# Patient Record
Sex: MALE | Race: WHITE | NOT HISPANIC OR LATINO | Employment: OTHER | URBAN - METROPOLITAN AREA
[De-identification: names, ages, dates, MRNs, and addresses within clinical notes are randomized per-mention and may not be internally consistent; named-entity substitution may affect disease eponyms.]

---

## 2017-01-05 ENCOUNTER — GENERIC CONVERSION - ENCOUNTER (OUTPATIENT)
Dept: OTHER | Facility: OTHER | Age: 52
End: 2017-01-05

## 2018-01-10 NOTE — RESULT NOTES
Message   Recorded as Task   Date: 09/27/2016 03:15 PM, Created By: Jerica Aguirre   Task Name: Follow Up   Assigned To: Lisette Livingston   Regarding Patient: Sharon Campos, Status: In Progress   Comment:    Jerica Aguirre - 27 Sep 2016 3:15 PM     TASK CREATED  S/p Right L4-5 TFESI on 9/22/16 w/Dr Jalyn Wei in [facility]  No f/u scheduled    Please call 9/29/16   Jerica Aguirre - 30 Sep 2016 3:05 PM     TASK EDITED  1st attempt-lmom for f/u after injectMorenita Logan - 03 Oct 2016 10:26 AM     TASK EDITED  S/w patient for followup after injection    -reports 80-85% relief  -states he still has some numbness and tingling down the right side  -reports yesterday he felt great and today he feels a little sore from being able to do things   -last night was the first he was able to sleep in his bed   -if any recommadations, please call patient back on cell # [phone]   Buck Moreira - 03 Oct 2016 1:14 PM     TASK REPLIED TO: Previously Assigned To Buck Moreira repeat injection x 1 and then f/u with dg after   Jerica Aguirre - 04 Oct 2016 2:24 PM     TASK REASSIGNED: Previously Assigned To [facility] procedure,Team  Please call pt to schedule right L4-5 TFESI   Lisette Livingston - 04 Oct 2016 4:13 PM     TASK REASSIGNED: Previously Assigned To [facility] surgery sched,Team   Lisette Livingston - 05 Oct 2016 9:45 AM     TASK EDITED  procedure scheduled for 10/6/2016-reviewed pre-procedure instructions with pt

## 2018-01-12 NOTE — RESULT NOTES
Message   Recorded as Task   Date: 12/28/2016 10:40 AM, Created By: Krystina Guerrier   Task Name: Follow Up   Assigned To: [facility] procedure,Team   Regarding Patient: Nuzhat Reinoso, Status: Active   Comment:    Jerica Aguirre - 28 Dec 2016 10:40 AM     TASK CREATED  S/p Left L4/5 TFESI on 12/22/16 w/Dr Radha Gil in [place] [facility]  No f/u scheduled    Please call 12/29/16   Jerica Aguirre - 30 Dec 2016 3:43 PM     TASK EDITED  1st attempt-lm for f/u after injection   Nohemi Milan - 05 Jan 8027 2:48 PM     TASK EDITED  Pt reports 100% relief and is doing well  Patient is going to start riding his exercise bike  Will f/u PRN     Buck Moreira - 05 Jan 2017 1:52 PM     TASK REPLIED TO: Previously Assigned To Buck Moreira  [place]        Signatures   Electronically signed by : Sukumar Carrera, ; Jan 5 2017  1:54PM EST                       (Author)

## 2018-01-15 NOTE — RESULT NOTES
Message   Recorded as Task   Date: 12/19/2016 09:35 AM, Created By: Jessi Alcantar   Task Name: Medical Complaint Callback   Assigned To: Lisette Livingston   Regarding Patient: Roney Knight, Status: Active   Comment:    JavierDanielle cuadra - 19 Dec 2016 9:35 AM     TASK CREATED  Caller: Self; Medical Complaint; [phone] (Home)  Received a vmlom from 0919 today stating he had two Right L4-5 TFESI's previously ( 9/22 and 10/06)with you and the symptoms that were going down his RLE are now gone  But for the past few nights he has noticed that the pain is now going down his LLE and he is having trouble sleeping  He has no sovs scheduled  SL to advise   thanks   Buck Moreira - 19 Dec 2016 9:49 AM     TASK REPLIED TO: Previously Assigned To Lisette Livingston  left l4/5 tfesi x 2, same codes   Lisette Livingston - 19 Dec 2016 11:06 AM     TASK EDITED  procedure scheduled for 12/23/2016

## 2018-01-18 NOTE — RESULT NOTES
Message   Recorded as Task   Date: 09/27/2016 03:15 PM, Created By: Jerica Aguirre   Task Name: Follow Up   Assigned To: Lisette Livingston   Regarding Patient: Zoran Lopez, Status: In Progress   Comment:    Jerica Aguirre - 27 Sep 2016 3:15 PM     TASK CREATED  S/p Right L4-5 TFESI on 9/22/16 w/Dr Florentin Arroyo in [place]  No f/u scheduled    Please call 9/29/16   Jerica Aguirre - 30 Sep 2016 3:05 PM     TASK EDITED  1st attempt-lmom for f/u after injectMorenita Logan - 03 Oct 2016 10:26 AM     TASK EDITED  S/w patient for followup after injection    -reports 80-85% relief  -states he still has some numbness and tingling down the right side  -reports yesterday he felt great and today he feels a little sore from being able to do things   -last night was the first he was able to sleep in his bed   -if any recommadations, please call patient back on cell # [phone]   Buck Moreira - 03 Oct 2016 1:14 PM     TASK REPLIED TO: Previously Assigned To Buck Moreira repeat injection x 1 and then f/u with dg after   Jerica Aguirre - 04 Oct 2016 2:24 PM     TASK REASSIGNED: Previously Assigned To [facility] procedure,Team  Please call pt to schedule right L4-5 TFESI   Lisette Livingston - 04 Oct 2016 4:13 PM     TASK REASSIGNED: Previously Assigned To [facility] sched,Team   Lisette Livingston - 05 Oct 2016 9:45 AM     TASK EDITED  procedure scheduled for 10/6/2016-reviewed pre-procedure instructions with pt

## 2019-04-13 ENCOUNTER — OFFICE VISIT (OUTPATIENT)
Dept: URGENT CARE | Facility: CLINIC | Age: 54
End: 2019-04-13
Payer: COMMERCIAL

## 2019-04-13 VITALS
HEIGHT: 76 IN | TEMPERATURE: 97.6 F | WEIGHT: 240 LBS | HEART RATE: 80 BPM | DIASTOLIC BLOOD PRESSURE: 78 MMHG | OXYGEN SATURATION: 96 % | SYSTOLIC BLOOD PRESSURE: 132 MMHG | RESPIRATION RATE: 16 BRPM | BODY MASS INDEX: 29.22 KG/M2

## 2019-04-13 DIAGNOSIS — S61.432A PUNCTURE WOUND OF LEFT HAND, FOREIGN BODY PRESENCE UNSPECIFIED, INITIAL ENCOUNTER: Primary | ICD-10-CM

## 2019-04-13 PROCEDURE — 90715 TDAP VACCINE 7 YRS/> IM: CPT

## 2019-04-13 PROCEDURE — 99213 OFFICE O/P EST LOW 20 MIN: CPT | Performed by: EMERGENCY MEDICINE

## 2019-04-13 RX ORDER — LIDOCAINE HYDROCHLORIDE 10 MG/ML
5 INJECTION, SOLUTION EPIDURAL; INFILTRATION; INTRACAUDAL; PERINEURAL ONCE
Status: COMPLETED | OUTPATIENT
Start: 2019-04-13 | End: 2019-04-13

## 2019-04-13 RX ORDER — CEPHALEXIN 500 MG/1
500 CAPSULE ORAL EVERY 6 HOURS SCHEDULED
Qty: 40 CAPSULE | Refills: 0 | Status: SHIPPED | OUTPATIENT
Start: 2019-04-13 | End: 2019-04-18 | Stop reason: HOSPADM

## 2019-04-13 RX ADMIN — LIDOCAINE HYDROCHLORIDE 5 ML: 10 INJECTION, SOLUTION EPIDURAL; INFILTRATION; INTRACAUDAL; PERINEURAL at 12:19

## 2019-04-15 ENCOUNTER — OFFICE VISIT (OUTPATIENT)
Dept: OBGYN CLINIC | Facility: CLINIC | Age: 54
End: 2019-04-15
Payer: COMMERCIAL

## 2019-04-15 ENCOUNTER — APPOINTMENT (INPATIENT)
Dept: RADIOLOGY | Facility: HOSPITAL | Age: 54
DRG: 982 | End: 2019-04-15
Payer: COMMERCIAL

## 2019-04-15 ENCOUNTER — HOSPITAL ENCOUNTER (INPATIENT)
Facility: HOSPITAL | Age: 54
LOS: 3 days | Discharge: HOME | DRG: 982 | End: 2019-04-18
Attending: ORTHOPAEDIC SURGERY | Admitting: ORTHOPAEDIC SURGERY
Payer: COMMERCIAL

## 2019-04-15 VITALS
HEIGHT: 76 IN | WEIGHT: 246.6 LBS | HEART RATE: 76 BPM | SYSTOLIC BLOOD PRESSURE: 123 MMHG | DIASTOLIC BLOOD PRESSURE: 81 MMHG | BODY MASS INDEX: 30.03 KG/M2

## 2019-04-15 DIAGNOSIS — S60.512A INFECTED ABRASION OF LEFT HAND, INITIAL ENCOUNTER: ICD-10-CM

## 2019-04-15 DIAGNOSIS — S60.512A INFECTED ABRASION OF LEFT HAND, INITIAL ENCOUNTER: Primary | ICD-10-CM

## 2019-04-15 DIAGNOSIS — L08.9 INFECTED ABRASION OF LEFT HAND, INITIAL ENCOUNTER: Primary | ICD-10-CM

## 2019-04-15 DIAGNOSIS — L03.119 CELLULITIS AND ABSCESS OF HAND: Primary | ICD-10-CM

## 2019-04-15 DIAGNOSIS — L08.9 INFECTED ABRASION OF LEFT HAND, INITIAL ENCOUNTER: ICD-10-CM

## 2019-04-15 DIAGNOSIS — L02.519 CELLULITIS AND ABSCESS OF HAND: Primary | ICD-10-CM

## 2019-04-15 LAB
ABO GROUP BLD: NORMAL
ANION GAP SERPL CALCULATED.3IONS-SCNC: 5 MMOL/L (ref 4–13)
APTT PPP: 28 SECONDS (ref 26–38)
BLD GP AB SCN SERPL QL: NEGATIVE
BUN SERPL-MCNC: 24 MG/DL (ref 5–25)
CALCIUM SERPL-MCNC: 8.4 MG/DL (ref 8.3–10.1)
CHLORIDE SERPL-SCNC: 108 MMOL/L (ref 100–108)
CO2 SERPL-SCNC: 27 MMOL/L (ref 21–32)
CREAT SERPL-MCNC: 1.07 MG/DL (ref 0.6–1.3)
ERYTHROCYTE [DISTWIDTH] IN BLOOD BY AUTOMATED COUNT: 12.3 % (ref 11.6–15.1)
GFR SERPL CREATININE-BSD FRML MDRD: 79 ML/MIN/1.73SQ M
GLUCOSE SERPL-MCNC: 121 MG/DL (ref 65–140)
HCT VFR BLD AUTO: 41.1 % (ref 36.5–49.3)
HGB BLD-MCNC: 14.3 G/DL (ref 12–17)
INR PPP: 0.96 (ref 0.86–1.17)
MCH RBC QN AUTO: 33.8 PG (ref 26.8–34.3)
MCHC RBC AUTO-ENTMCNC: 34.8 G/DL (ref 31.4–37.4)
MCV RBC AUTO: 97 FL (ref 82–98)
PLATELET # BLD AUTO: 207 THOUSANDS/UL (ref 149–390)
PMV BLD AUTO: 10.3 FL (ref 8.9–12.7)
POTASSIUM SERPL-SCNC: 3.8 MMOL/L (ref 3.5–5.3)
PROTHROMBIN TIME: 12.9 SECONDS (ref 11.8–14.2)
RBC # BLD AUTO: 4.23 MILLION/UL (ref 3.88–5.62)
RH BLD: POSITIVE
SODIUM SERPL-SCNC: 140 MMOL/L (ref 136–145)
SPECIMEN EXPIRATION DATE: NORMAL
WBC # BLD AUTO: 5.52 THOUSAND/UL (ref 4.31–10.16)

## 2019-04-15 PROCEDURE — 85610 PROTHROMBIN TIME: CPT | Performed by: STUDENT IN AN ORGANIZED HEALTH CARE EDUCATION/TRAINING PROGRAM

## 2019-04-15 PROCEDURE — 80048 BASIC METABOLIC PNL TOTAL CA: CPT | Performed by: STUDENT IN AN ORGANIZED HEALTH CARE EDUCATION/TRAINING PROGRAM

## 2019-04-15 PROCEDURE — 85730 THROMBOPLASTIN TIME PARTIAL: CPT | Performed by: STUDENT IN AN ORGANIZED HEALTH CARE EDUCATION/TRAINING PROGRAM

## 2019-04-15 PROCEDURE — 86900 BLOOD TYPING SEROLOGIC ABO: CPT | Performed by: STUDENT IN AN ORGANIZED HEALTH CARE EDUCATION/TRAINING PROGRAM

## 2019-04-15 PROCEDURE — 99204 OFFICE O/P NEW MOD 45 MIN: CPT | Performed by: ORTHOPAEDIC SURGERY

## 2019-04-15 PROCEDURE — 86850 RBC ANTIBODY SCREEN: CPT | Performed by: STUDENT IN AN ORGANIZED HEALTH CARE EDUCATION/TRAINING PROGRAM

## 2019-04-15 PROCEDURE — 73130 X-RAY EXAM OF HAND: CPT

## 2019-04-15 PROCEDURE — 93005 ELECTROCARDIOGRAM TRACING: CPT

## 2019-04-15 PROCEDURE — 85027 COMPLETE CBC AUTOMATED: CPT | Performed by: STUDENT IN AN ORGANIZED HEALTH CARE EDUCATION/TRAINING PROGRAM

## 2019-04-15 PROCEDURE — 71045 X-RAY EXAM CHEST 1 VIEW: CPT

## 2019-04-15 PROCEDURE — 86901 BLOOD TYPING SEROLOGIC RH(D): CPT | Performed by: STUDENT IN AN ORGANIZED HEALTH CARE EDUCATION/TRAINING PROGRAM

## 2019-04-15 RX ORDER — ACETAMINOPHEN 325 MG/1
650 TABLET ORAL EVERY 6 HOURS SCHEDULED
Status: DISCONTINUED | OUTPATIENT
Start: 2019-04-15 | End: 2019-04-18 | Stop reason: HOSPADM

## 2019-04-15 RX ORDER — IBUPROFEN 400 MG/1
TABLET ORAL EVERY 6 HOURS PRN
COMMUNITY

## 2019-04-15 RX ORDER — HYDROMORPHONE HCL/PF 1 MG/ML
0.5 SYRINGE (ML) INJECTION
Status: DISCONTINUED | OUTPATIENT
Start: 2019-04-15 | End: 2019-04-18 | Stop reason: HOSPADM

## 2019-04-15 RX ORDER — DOCUSATE SODIUM 100 MG/1
100 CAPSULE, LIQUID FILLED ORAL 2 TIMES DAILY
Status: DISCONTINUED | OUTPATIENT
Start: 2019-04-15 | End: 2019-04-18 | Stop reason: HOSPADM

## 2019-04-15 RX ORDER — SENNOSIDES 8.6 MG
1 TABLET ORAL DAILY
Status: DISCONTINUED | OUTPATIENT
Start: 2019-04-16 | End: 2019-04-17

## 2019-04-15 RX ORDER — OXYCODONE HYDROCHLORIDE 10 MG/1
10 TABLET ORAL EVERY 4 HOURS PRN
Status: DISCONTINUED | OUTPATIENT
Start: 2019-04-15 | End: 2019-04-18 | Stop reason: HOSPADM

## 2019-04-15 RX ORDER — OXYCODONE HYDROCHLORIDE 5 MG/1
5 TABLET ORAL EVERY 4 HOURS PRN
Status: DISCONTINUED | OUTPATIENT
Start: 2019-04-15 | End: 2019-04-18 | Stop reason: HOSPADM

## 2019-04-15 RX ADMIN — AMPICILLIN SODIUM AND SULBACTAM SODIUM 3 G: 2; 1 INJECTION, POWDER, FOR SOLUTION INTRAMUSCULAR; INTRAVENOUS at 19:04

## 2019-04-15 RX ADMIN — OXYCODONE HYDROCHLORIDE 10 MG: 10 TABLET ORAL at 23:04

## 2019-04-15 RX ADMIN — ACETAMINOPHEN 650 MG: 325 TABLET, FILM COATED ORAL at 20:31

## 2019-04-15 RX ADMIN — OXYCODONE HYDROCHLORIDE 5 MG: 5 TABLET ORAL at 19:13

## 2019-04-15 RX ADMIN — DOCUSATE SODIUM 100 MG: 100 CAPSULE, LIQUID FILLED ORAL at 20:31

## 2019-04-16 ENCOUNTER — ANESTHESIA EVENT (INPATIENT)
Dept: PERIOP | Facility: HOSPITAL | Age: 54
DRG: 982 | End: 2019-04-16
Payer: COMMERCIAL

## 2019-04-16 ENCOUNTER — ANESTHESIA (INPATIENT)
Dept: PERIOP | Facility: HOSPITAL | Age: 54
DRG: 982 | End: 2019-04-16
Payer: COMMERCIAL

## 2019-04-16 LAB
ANION GAP SERPL CALCULATED.3IONS-SCNC: 4 MMOL/L (ref 4–13)
ATRIAL RATE: 74 BPM
BASOPHILS # BLD AUTO: 0.02 THOUSANDS/ΜL (ref 0–0.1)
BASOPHILS NFR BLD AUTO: 0 % (ref 0–1)
BUN SERPL-MCNC: 21 MG/DL (ref 5–25)
CALCIUM SERPL-MCNC: 8 MG/DL (ref 8.3–10.1)
CHLORIDE SERPL-SCNC: 109 MMOL/L (ref 100–108)
CO2 SERPL-SCNC: 26 MMOL/L (ref 21–32)
CREAT SERPL-MCNC: 1.11 MG/DL (ref 0.6–1.3)
EOSINOPHIL # BLD AUTO: 0.21 THOUSAND/ΜL (ref 0–0.61)
EOSINOPHIL NFR BLD AUTO: 5 % (ref 0–6)
ERYTHROCYTE [DISTWIDTH] IN BLOOD BY AUTOMATED COUNT: 12.5 % (ref 11.6–15.1)
GFR SERPL CREATININE-BSD FRML MDRD: 76 ML/MIN/1.73SQ M
GLUCOSE SERPL-MCNC: 103 MG/DL (ref 65–140)
HCT VFR BLD AUTO: 41.9 % (ref 36.5–49.3)
HGB BLD-MCNC: 13.9 G/DL (ref 12–17)
IMM GRANULOCYTES # BLD AUTO: 0.01 THOUSAND/UL (ref 0–0.2)
IMM GRANULOCYTES NFR BLD AUTO: 0 % (ref 0–2)
LYMPHOCYTES # BLD AUTO: 1.44 THOUSANDS/ΜL (ref 0.6–4.47)
LYMPHOCYTES NFR BLD AUTO: 32 % (ref 14–44)
MCH RBC QN AUTO: 33 PG (ref 26.8–34.3)
MCHC RBC AUTO-ENTMCNC: 33.2 G/DL (ref 31.4–37.4)
MCV RBC AUTO: 100 FL (ref 82–98)
MONOCYTES # BLD AUTO: 0.57 THOUSAND/ΜL (ref 0.17–1.22)
MONOCYTES NFR BLD AUTO: 13 % (ref 4–12)
NEUTROPHILS # BLD AUTO: 2.25 THOUSANDS/ΜL (ref 1.85–7.62)
NEUTS SEG NFR BLD AUTO: 50 % (ref 43–75)
NRBC BLD AUTO-RTO: 0 /100 WBCS
P AXIS: 44 DEGREES
PLATELET # BLD AUTO: 186 THOUSANDS/UL (ref 149–390)
PMV BLD AUTO: 9.8 FL (ref 8.9–12.7)
POTASSIUM SERPL-SCNC: 4.1 MMOL/L (ref 3.5–5.3)
PR INTERVAL: 172 MS
QRS AXIS: 11 DEGREES
QRSD INTERVAL: 94 MS
QT INTERVAL: 370 MS
QTC INTERVAL: 410 MS
RBC # BLD AUTO: 4.21 MILLION/UL (ref 3.88–5.62)
SODIUM SERPL-SCNC: 139 MMOL/L (ref 136–145)
T WAVE AXIS: 25 DEGREES
VENTRICULAR RATE: 74 BPM
WBC # BLD AUTO: 4.5 THOUSAND/UL (ref 4.31–10.16)

## 2019-04-16 PROCEDURE — 20103 EXPL PENTRG WOUND EXTREMITY: CPT | Performed by: ORTHOPAEDIC SURGERY

## 2019-04-16 PROCEDURE — 87075 CULTR BACTERIA EXCEPT BLOOD: CPT | Performed by: ORTHOPAEDIC SURGERY

## 2019-04-16 PROCEDURE — 87116 MYCOBACTERIA CULTURE: CPT | Performed by: ORTHOPAEDIC SURGERY

## 2019-04-16 PROCEDURE — 99024 POSTOP FOLLOW-UP VISIT: CPT | Performed by: ORTHOPAEDIC SURGERY

## 2019-04-16 PROCEDURE — 0KBD0ZZ EXCISION OF LEFT HAND MUSCLE, OPEN APPROACH: ICD-10-PCS | Performed by: ORTHOPAEDIC SURGERY

## 2019-04-16 PROCEDURE — 87205 SMEAR GRAM STAIN: CPT | Performed by: ORTHOPAEDIC SURGERY

## 2019-04-16 PROCEDURE — NC001 PR NO CHARGE: Performed by: ORTHOPAEDIC SURGERY

## 2019-04-16 PROCEDURE — 85025 COMPLETE CBC W/AUTO DIFF WBC: CPT | Performed by: STUDENT IN AN ORGANIZED HEALTH CARE EDUCATION/TRAINING PROGRAM

## 2019-04-16 PROCEDURE — 80048 BASIC METABOLIC PNL TOTAL CA: CPT | Performed by: STUDENT IN AN ORGANIZED HEALTH CARE EDUCATION/TRAINING PROGRAM

## 2019-04-16 PROCEDURE — 93010 ELECTROCARDIOGRAM REPORT: CPT | Performed by: INTERNAL MEDICINE

## 2019-04-16 PROCEDURE — 99254 IP/OBS CNSLTJ NEW/EST MOD 60: CPT | Performed by: INTERNAL MEDICINE

## 2019-04-16 PROCEDURE — 87102 FUNGUS ISOLATION CULTURE: CPT | Performed by: ORTHOPAEDIC SURGERY

## 2019-04-16 PROCEDURE — 87070 CULTURE OTHR SPECIMN AEROBIC: CPT | Performed by: ORTHOPAEDIC SURGERY

## 2019-04-16 PROCEDURE — 87206 SMEAR FLUORESCENT/ACID STAI: CPT | Performed by: ORTHOPAEDIC SURGERY

## 2019-04-16 RX ORDER — SODIUM CHLORIDE, SODIUM LACTATE, POTASSIUM CHLORIDE, CALCIUM CHLORIDE 600; 310; 30; 20 MG/100ML; MG/100ML; MG/100ML; MG/100ML
50 INJECTION, SOLUTION INTRAVENOUS CONTINUOUS
Status: DISCONTINUED | OUTPATIENT
Start: 2019-04-16 | End: 2019-04-18 | Stop reason: HOSPADM

## 2019-04-16 RX ORDER — CEFAZOLIN SODIUM 2 G/50ML
2000 SOLUTION INTRAVENOUS
Status: COMPLETED | OUTPATIENT
Start: 2019-04-16 | End: 2019-04-16

## 2019-04-16 RX ORDER — PROPOFOL 10 MG/ML
INJECTION, EMULSION INTRAVENOUS AS NEEDED
Status: DISCONTINUED | OUTPATIENT
Start: 2019-04-16 | End: 2019-04-16 | Stop reason: SURG

## 2019-04-16 RX ORDER — FENTANYL CITRATE 50 UG/ML
INJECTION, SOLUTION INTRAMUSCULAR; INTRAVENOUS AS NEEDED
Status: DISCONTINUED | OUTPATIENT
Start: 2019-04-16 | End: 2019-04-16 | Stop reason: SURG

## 2019-04-16 RX ORDER — ONDANSETRON 2 MG/ML
4 INJECTION INTRAMUSCULAR; INTRAVENOUS ONCE AS NEEDED
Status: DISCONTINUED | OUTPATIENT
Start: 2019-04-16 | End: 2019-04-16 | Stop reason: HOSPADM

## 2019-04-16 RX ORDER — ONDANSETRON 2 MG/ML
INJECTION INTRAMUSCULAR; INTRAVENOUS AS NEEDED
Status: DISCONTINUED | OUTPATIENT
Start: 2019-04-16 | End: 2019-04-16 | Stop reason: SURG

## 2019-04-16 RX ORDER — LIDOCAINE HYDROCHLORIDE 10 MG/ML
INJECTION, SOLUTION INFILTRATION; PERINEURAL AS NEEDED
Status: DISCONTINUED | OUTPATIENT
Start: 2019-04-16 | End: 2019-04-16 | Stop reason: SURG

## 2019-04-16 RX ORDER — SODIUM CHLORIDE, SODIUM LACTATE, POTASSIUM CHLORIDE, CALCIUM CHLORIDE 600; 310; 30; 20 MG/100ML; MG/100ML; MG/100ML; MG/100ML
INJECTION, SOLUTION INTRAVENOUS CONTINUOUS PRN
Status: DISCONTINUED | OUTPATIENT
Start: 2019-04-16 | End: 2019-04-16 | Stop reason: SURG

## 2019-04-16 RX ORDER — FENTANYL CITRATE/PF 50 MCG/ML
50 SYRINGE (ML) INJECTION
Status: DISCONTINUED | OUTPATIENT
Start: 2019-04-16 | End: 2019-04-16 | Stop reason: HOSPADM

## 2019-04-16 RX ORDER — DEXAMETHASONE SODIUM PHOSPHATE 10 MG/ML
INJECTION, SOLUTION INTRAMUSCULAR; INTRAVENOUS AS NEEDED
Status: DISCONTINUED | OUTPATIENT
Start: 2019-04-16 | End: 2019-04-16 | Stop reason: SURG

## 2019-04-16 RX ORDER — FENTANYL CITRATE/PF 50 MCG/ML
25 SYRINGE (ML) INJECTION
Status: DISCONTINUED | OUTPATIENT
Start: 2019-04-16 | End: 2019-04-16 | Stop reason: HOSPADM

## 2019-04-16 RX ORDER — CEFAZOLIN SODIUM 2 G/50ML
2000 SOLUTION INTRAVENOUS EVERY 8 HOURS
Status: DISCONTINUED | OUTPATIENT
Start: 2019-04-16 | End: 2019-04-18 | Stop reason: HOSPADM

## 2019-04-16 RX ORDER — SODIUM CHLORIDE, SODIUM LACTATE, POTASSIUM CHLORIDE, CALCIUM CHLORIDE 600; 310; 30; 20 MG/100ML; MG/100ML; MG/100ML; MG/100ML
20 INJECTION, SOLUTION INTRAVENOUS CONTINUOUS
Status: DISCONTINUED | OUTPATIENT
Start: 2019-04-16 | End: 2019-04-18 | Stop reason: HOSPADM

## 2019-04-16 RX ADMIN — FENTANYL CITRATE 50 MCG: 50 INJECTION, SOLUTION INTRAMUSCULAR; INTRAVENOUS at 13:33

## 2019-04-16 RX ADMIN — FENTANYL CITRATE 50 MCG: 50 INJECTION, SOLUTION INTRAMUSCULAR; INTRAVENOUS at 12:13

## 2019-04-16 RX ADMIN — SODIUM CHLORIDE, SODIUM LACTATE, POTASSIUM CHLORIDE, AND CALCIUM CHLORIDE 20 ML/HR: .6; .31; .03; .02 INJECTION, SOLUTION INTRAVENOUS at 14:19

## 2019-04-16 RX ADMIN — ACETAMINOPHEN 650 MG: 325 TABLET, FILM COATED ORAL at 17:13

## 2019-04-16 RX ADMIN — HYDROMORPHONE HYDROCHLORIDE 0.5 MG: 1 INJECTION, SOLUTION INTRAMUSCULAR; INTRAVENOUS; SUBCUTANEOUS at 21:25

## 2019-04-16 RX ADMIN — AMPICILLIN SODIUM AND SULBACTAM SODIUM 3 G: 2; 1 INJECTION, POWDER, FOR SOLUTION INTRAMUSCULAR; INTRAVENOUS at 02:06

## 2019-04-16 RX ADMIN — ACETAMINOPHEN 650 MG: 325 TABLET, FILM COATED ORAL at 05:14

## 2019-04-16 RX ADMIN — FENTANYL CITRATE 50 MCG: 50 INJECTION, SOLUTION INTRAMUSCULAR; INTRAVENOUS at 13:39

## 2019-04-16 RX ADMIN — CEFAZOLIN SODIUM 2000 MG: 2 SOLUTION INTRAVENOUS at 21:25

## 2019-04-16 RX ADMIN — CEFAZOLIN SODIUM 2000 MG: 2 SOLUTION INTRAVENOUS at 12:13

## 2019-04-16 RX ADMIN — ONDANSETRON 4 MG: 2 INJECTION INTRAMUSCULAR; INTRAVENOUS at 12:25

## 2019-04-16 RX ADMIN — OXYCODONE HYDROCHLORIDE 10 MG: 10 TABLET ORAL at 03:23

## 2019-04-16 RX ADMIN — AMPICILLIN SODIUM AND SULBACTAM SODIUM 3 G: 2; 1 INJECTION, POWDER, FOR SOLUTION INTRAMUSCULAR; INTRAVENOUS at 08:59

## 2019-04-16 RX ADMIN — FENTANYL CITRATE 25 MCG: 50 INJECTION, SOLUTION INTRAMUSCULAR; INTRAVENOUS at 12:41

## 2019-04-16 RX ADMIN — DOCUSATE SODIUM 100 MG: 100 CAPSULE, LIQUID FILLED ORAL at 17:13

## 2019-04-16 RX ADMIN — DEXAMETHASONE SODIUM PHOSPHATE 5 MG: 10 INJECTION, SOLUTION INTRAMUSCULAR; INTRAVENOUS at 12:25

## 2019-04-16 RX ADMIN — PROPOFOL 200 MG: 10 INJECTION, EMULSION INTRAVENOUS at 12:18

## 2019-04-16 RX ADMIN — SODIUM CHLORIDE, SODIUM LACTATE, POTASSIUM CHLORIDE, AND CALCIUM CHLORIDE: .6; .31; .03; .02 INJECTION, SOLUTION INTRAVENOUS at 12:13

## 2019-04-16 RX ADMIN — LIDOCAINE HYDROCHLORIDE 50 MG: 10 INJECTION, SOLUTION INFILTRATION; PERINEURAL at 12:18

## 2019-04-16 RX ADMIN — OXYCODONE HYDROCHLORIDE 5 MG: 5 TABLET ORAL at 19:44

## 2019-04-16 RX ADMIN — FENTANYL CITRATE 25 MCG: 50 INJECTION, SOLUTION INTRAMUSCULAR; INTRAVENOUS at 12:30

## 2019-04-17 PROCEDURE — 99232 SBSQ HOSP IP/OBS MODERATE 35: CPT | Performed by: INTERNAL MEDICINE

## 2019-04-17 PROCEDURE — 99024 POSTOP FOLLOW-UP VISIT: CPT | Performed by: ORTHOPAEDIC SURGERY

## 2019-04-17 RX ORDER — POLYETHYLENE GLYCOL 3350 17 G/17G
17 POWDER, FOR SOLUTION ORAL DAILY PRN
Status: DISCONTINUED | OUTPATIENT
Start: 2019-04-17 | End: 2019-04-18 | Stop reason: HOSPADM

## 2019-04-17 RX ADMIN — CEFAZOLIN SODIUM 2000 MG: 2 SOLUTION INTRAVENOUS at 12:29

## 2019-04-17 RX ADMIN — DOCUSATE SODIUM 100 MG: 100 CAPSULE, LIQUID FILLED ORAL at 17:04

## 2019-04-17 RX ADMIN — ACETAMINOPHEN 650 MG: 325 TABLET, FILM COATED ORAL at 05:46

## 2019-04-17 RX ADMIN — DOCUSATE SODIUM 100 MG: 100 CAPSULE, LIQUID FILLED ORAL at 08:17

## 2019-04-17 RX ADMIN — OXYCODONE HYDROCHLORIDE 10 MG: 10 TABLET ORAL at 09:41

## 2019-04-17 RX ADMIN — NICOTINE 1 PATCH: 7 PATCH TRANSDERMAL at 08:39

## 2019-04-17 RX ADMIN — ACETAMINOPHEN 650 MG: 325 TABLET, FILM COATED ORAL at 17:05

## 2019-04-17 RX ADMIN — OXYCODONE HYDROCHLORIDE 5 MG: 5 TABLET ORAL at 22:11

## 2019-04-17 RX ADMIN — ACETAMINOPHEN 650 MG: 325 TABLET, FILM COATED ORAL at 12:28

## 2019-04-17 RX ADMIN — HYDROMORPHONE HYDROCHLORIDE 0.5 MG: 1 INJECTION, SOLUTION INTRAMUSCULAR; INTRAVENOUS; SUBCUTANEOUS at 16:57

## 2019-04-17 RX ADMIN — CEFAZOLIN SODIUM 2000 MG: 2 SOLUTION INTRAVENOUS at 20:04

## 2019-04-17 RX ADMIN — SENNOSIDES 8.6 MG: 8.6 TABLET, FILM COATED ORAL at 08:17

## 2019-04-17 RX ADMIN — OXYCODONE HYDROCHLORIDE 10 MG: 10 TABLET ORAL at 04:15

## 2019-04-17 RX ADMIN — CEFAZOLIN SODIUM 2000 MG: 2 SOLUTION INTRAVENOUS at 03:54

## 2019-04-18 VITALS
TEMPERATURE: 98.8 F | DIASTOLIC BLOOD PRESSURE: 84 MMHG | SYSTOLIC BLOOD PRESSURE: 121 MMHG | WEIGHT: 246.6 LBS | HEART RATE: 81 BPM | HEIGHT: 76 IN | OXYGEN SATURATION: 96 % | BODY MASS INDEX: 30.03 KG/M2 | RESPIRATION RATE: 17 BRPM

## 2019-04-18 PROCEDURE — 99232 SBSQ HOSP IP/OBS MODERATE 35: CPT | Performed by: INTERNAL MEDICINE

## 2019-04-18 PROCEDURE — NC001 PR NO CHARGE: Performed by: ORTHOPAEDIC SURGERY

## 2019-04-18 PROCEDURE — NS001 PR NO SIGNATURE OR ATTESTATION: Performed by: ORTHOPAEDIC SURGERY

## 2019-04-18 RX ORDER — DOCUSATE SODIUM 100 MG/1
100 CAPSULE, LIQUID FILLED ORAL 2 TIMES DAILY
Qty: 10 CAPSULE | Refills: 0 | Status: SHIPPED | OUTPATIENT
Start: 2019-04-18 | End: 2021-04-06 | Stop reason: SDUPTHER

## 2019-04-18 RX ORDER — CEPHALEXIN 500 MG/1
500 CAPSULE ORAL 4 TIMES DAILY
Qty: 24 CAPSULE | Refills: 0 | Status: SHIPPED | OUTPATIENT
Start: 2019-04-18 | End: 2019-04-24

## 2019-04-18 RX ADMIN — ACETAMINOPHEN 650 MG: 325 TABLET, FILM COATED ORAL at 06:23

## 2019-04-18 RX ADMIN — OXYCODONE HYDROCHLORIDE 5 MG: 5 TABLET ORAL at 03:29

## 2019-04-18 RX ADMIN — DOCUSATE SODIUM 100 MG: 100 CAPSULE, LIQUID FILLED ORAL at 08:05

## 2019-04-18 RX ADMIN — NICOTINE 1 PATCH: 7 PATCH TRANSDERMAL at 08:05

## 2019-04-18 RX ADMIN — CEFAZOLIN SODIUM 2000 MG: 2 SOLUTION INTRAVENOUS at 11:35

## 2019-04-18 RX ADMIN — CEFAZOLIN SODIUM 2000 MG: 2 SOLUTION INTRAVENOUS at 03:29

## 2019-04-18 RX ADMIN — OXYCODONE HYDROCHLORIDE 10 MG: 10 TABLET ORAL at 08:04

## 2019-04-18 RX ADMIN — PSYLLIUM HUSK 1 PACKET: 3.4 POWDER ORAL at 08:05

## 2019-04-18 RX ADMIN — ACETAMINOPHEN 650 MG: 325 TABLET, FILM COATED ORAL at 11:35

## 2019-04-19 ENCOUNTER — TELEPHONE (OUTPATIENT)
Dept: OBGYN CLINIC | Facility: HOSPITAL | Age: 54
End: 2019-04-19

## 2019-04-19 DIAGNOSIS — L08.9 INFECTED ABRASION OF LEFT HAND, SUBSEQUENT ENCOUNTER: Primary | ICD-10-CM

## 2019-04-19 DIAGNOSIS — S60.512D INFECTED ABRASION OF LEFT HAND, SUBSEQUENT ENCOUNTER: Primary | ICD-10-CM

## 2019-04-19 LAB
BACTERIA WND AEROBE CULT: NO GROWTH
GRAM STN SPEC: NORMAL

## 2019-04-19 RX ORDER — HYDROCODONE BITARTRATE AND ACETAMINOPHEN 5; 325 MG/1; MG/1
1 TABLET ORAL 2 TIMES DAILY PRN
Qty: 5 TABLET | Refills: 0 | Status: SHIPPED | OUTPATIENT
Start: 2019-04-19 | End: 2021-04-06 | Stop reason: ALTCHOICE

## 2019-04-20 LAB — BACTERIA SPEC ANAEROBE CULT: NO GROWTH

## 2019-04-22 ENCOUNTER — OFFICE VISIT (OUTPATIENT)
Dept: OBGYN CLINIC | Facility: CLINIC | Age: 54
End: 2019-04-22

## 2019-04-22 VITALS
HEIGHT: 76 IN | DIASTOLIC BLOOD PRESSURE: 88 MMHG | SYSTOLIC BLOOD PRESSURE: 136 MMHG | WEIGHT: 249.8 LBS | HEART RATE: 77 BPM | BODY MASS INDEX: 30.42 KG/M2

## 2019-04-22 DIAGNOSIS — L08.9 INFECTED ABRASION OF LEFT HAND, SUBSEQUENT ENCOUNTER: Primary | ICD-10-CM

## 2019-04-22 DIAGNOSIS — S60.512D INFECTED ABRASION OF LEFT HAND, SUBSEQUENT ENCOUNTER: Primary | ICD-10-CM

## 2019-04-22 PROCEDURE — 99024 POSTOP FOLLOW-UP VISIT: CPT | Performed by: PHYSICIAN ASSISTANT

## 2019-04-23 ENCOUNTER — TELEPHONE (OUTPATIENT)
Dept: OBGYN CLINIC | Facility: HOSPITAL | Age: 54
End: 2019-04-23

## 2019-04-29 ENCOUNTER — OFFICE VISIT (OUTPATIENT)
Dept: OBGYN CLINIC | Facility: CLINIC | Age: 54
End: 2019-04-29

## 2019-04-29 VITALS
BODY MASS INDEX: 30.74 KG/M2 | DIASTOLIC BLOOD PRESSURE: 91 MMHG | HEIGHT: 76 IN | SYSTOLIC BLOOD PRESSURE: 138 MMHG | HEART RATE: 78 BPM | WEIGHT: 252.4 LBS

## 2019-04-29 DIAGNOSIS — S60.512D INFECTED ABRASION OF LEFT HAND, SUBSEQUENT ENCOUNTER: Primary | ICD-10-CM

## 2019-04-29 DIAGNOSIS — L08.9 INFECTED ABRASION OF LEFT HAND, SUBSEQUENT ENCOUNTER: Primary | ICD-10-CM

## 2019-04-29 PROCEDURE — 99024 POSTOP FOLLOW-UP VISIT: CPT | Performed by: ORTHOPAEDIC SURGERY

## 2019-05-20 LAB — FUNGUS SPEC CULT: NORMAL

## 2019-06-04 LAB
MYCOBACTERIUM SPEC CULT: NORMAL
RHODAMINE-AURAMINE STN SPEC: NORMAL

## 2020-03-05 ENCOUNTER — EVALUATION (OUTPATIENT)
Dept: PHYSICAL THERAPY | Facility: CLINIC | Age: 55
End: 2020-03-05
Payer: COMMERCIAL

## 2020-03-05 DIAGNOSIS — Z96.651 TOTAL KNEE REPLACEMENT STATUS, RIGHT: ICD-10-CM

## 2020-03-05 DIAGNOSIS — Z96.659 STATUS POST REPLACEMENT OF KNEE JOINT: Primary | ICD-10-CM

## 2020-03-05 PROCEDURE — 97161 PT EVAL LOW COMPLEX 20 MIN: CPT | Performed by: PHYSICAL THERAPIST

## 2020-03-05 PROCEDURE — 97110 THERAPEUTIC EXERCISES: CPT | Performed by: PHYSICAL THERAPIST

## 2020-03-05 RX ORDER — GABAPENTIN 100 MG/1
100 CAPSULE ORAL 3 TIMES DAILY
COMMUNITY
End: 2021-04-06 | Stop reason: ALTCHOICE

## 2020-03-05 NOTE — PROGRESS NOTES
PT Evaluation     Today's date: 3/5/2020  Patient name: Matthias Dickson  : 1965  MRN: [id number]  Referring provider: Misha Godoy MD  Dx:   Encounter Diagnosis     ICD-10-CM    1  Status post replacement of knee joint Z96 659    2  Total knee replacement status, right Z96 651                   Assessment  Assessment details: Patient presents s/p right TKA with removal of ACL hardware on 20  Demonstrates decrease in right knee ROM and lower extremity strength, and increase swelling and tenderness around knee joint  Leana Bullock presents with the impairments as listed above and would benefit from skilled Physical Therapy to address these impairments in order to maximize functional capacity and return to prior level of function  Thank you for this referral!  Impairments: abnormal or restricted ROM, activity intolerance, impaired physical strength and pain with function    Goals  Impairment Goals:  1  Decrease right knee pain to 0/10 with activities in 8 weeks  2  Increase right knee ROM by 5* in 8 weeks  3  Increase right lower extremity strength by ½ grade in 8 weeks    Functional Goals:  1  Patient is able to navigate stairs reciprocally without knee pain in 8-12 weeks   2  Patient is able to ambulate without AD or knee pain in 8-12 weeks  3   Patient is able to sit for 1 hour without increase in right knee stiffness in 8-12 weeks    Plan  Patient would benefit from: PT eval and skilled physical therapy  Planned therapy interventions: joint mobilization, manual therapy, neuromuscular re-education, home exercise program, therapeutic exercise, therapeutic training, patient education and balance/weight bearing training  Frequency: 2x week  Duration in weeks: 8  Plan of Care expiration date: 2020  Treatment plan discussed with: patient        Subjective Evaluation    History of Present Illness  Mechanism of injury: 47 yo male presents s/p right TKA with removal of hardware from prior ACL surgery removed on 20  Pt went directly home following hospital rehab for 1 day  Pt notes increase in stiffness in right knee with prolong sitting and needs to get up and move every 30 minutes to relieve knee stiffness  Pt c/o severe bruising along right leg and foot appeared following walking 2 laps around the [employer], 5 days post surgery  Pt was concerned but bruising subsided over time        Pain  Current pain rating: 3  At best pain rating: 3  At worst pain ratin  Location: Right Knee    Patient Goals  Patient goals for therapy: increased strength, independence with ADLs/IADLs, increased motion and decreased pain          Objective     Passive Range of Motion     Right Knee   Flexion: 85 degrees with pain  Extension: -10 degrees with pain    Additional Passive Range of Motion Details  Passive SLR: 75* R (lacking 10* knee ext), 70* L    Incision closed and clean  Severe hypomobility of scar    Strength/Myotome Testing     Right Hip   Planes of Motion   Flexion: 4  Extension: 4  Abduction: 4  Adduction: 4-    Right Knee   Flexion: 4  Extension: 4- (increase in right knee pain)    Additional Strength Details  Mild R knee ext lag after 10x SLR    Swelling     Right Knee Girth Measurement (cm)   Joint line: 46 5 cm      Flowsheet Rows      Most Recent Value   PT/OT G-Codes   Current Score  49   Projected Score  62             Precautions: None      Manual  3/5            Right Knee PROM nv                                                                    Exercise Diary  3/5            Bike nv            Heel slides Strap  10"x10 R            Gastroc Stretch Strap  30"x3            SLR 10x R            Hip Abd s/l nv            Hip Add s/l nv            HSS Strap  30"x3 R            Standing HR nv            Mini Squats nv            Lunge Gastroc S nv            Step Ups                                                                                                                                      Modalities

## 2020-03-05 NOTE — LETTER
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    2  Total knee replacement status, right Z96 651        Dear Dr Alan Jarquin: Thank you for your recent referral of Breonna Pina  Please review the attached evaluation summary from Emmett's recent visit  Please verify that you agree with the plan of care by signing the attached order  If you have any questions or concerns, please do not hesitate to call  I sincerely appreciate the opportunity to share in the care of one of your patients and hope to have another opportunity to work with you in the near future  Sincerely,    Breonna Pina, PT      Referring Provider:      I certify that I have read the below Plan of Care and certify the need for these services furnished under this plan of treatment while under my care  Maye Ramirez MD  [address]  [facility] [address]  VIA Facsimile: [phone]          PT Evaluation     Today's date: 3/5/2020  Patient name: Breonna Pina  : 1965  MRN: [id number]  Referring provider: Dot Navarro MD  Dx:   Encounter Diagnosis     ICD-10-CM    1  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  Impairments: abnormal or restricted ROM, activity intolerance, impaired physical strength and pain with function    Goals  Impairment Goals:  1  Decrease right knee pain to 0/10 with activities in 8 weeks  2  Increase right knee ROM by 5* in 8 weeks  3  Increase right lower extremity strength by ½ grade in 8 weeks    Functional Goals:  1  Patient is able to navigate stairs reciprocally without knee pain in 8-12 weeks   2  Patient is able to ambulate without AD or knee pain in 8-12 weeks  3  Patient is able to sit for 1 hour without increase in right knee stiffness in 8-12 weeks    Plan  Patient would benefit from: PT eval and skilled physical therapy  Planned therapy interventions: joint mobilization, manual therapy, neuromuscular re-education, home exercise program, therapeutic exercise, therapeutic training, patient education and balance/weight bearing training  Frequency: 2x week  Duration in weeks: 8  Plan of Care expiration date: 2020  Treatment plan discussed with: patient        Subjective Evaluation    History of Present Illness  Mechanism of injury: 47 yo male presents s/p right TKA with removal of hardware from prior ACL surgery removed on 20  Pt went directly home following hospital rehab for 1 day  Pt notes increase in stiffness in right knee with prolong sitting and needs to get up and move every 30 minutes to relieve knee stiffness  Pt c/o severe bruising along right leg and foot appeared following walking 2 laps around the [employer], 5 days post surgery  Pt was concerned but bruising subsided over time        Pain  Current pain rating: 3  At best pain rating: 3  At worst pain ratin  Location: Right Knee    Patient Goals  Patient goals for therapy: increased strength, independence with ADLs/IADLs, increased motion and decreased pain          Objective     Passive Range of Motion     Right Knee   Flexion: 85 degrees with pain  Extension: -10 degrees with pain    Additional Passive Range of Motion Details  Passive SLR: 75* R (lacking 10* knee ext), 70* L    Incision closed and clean  Severe hypomobility of scar    Strength/Myotome Testing     Right Hip   Planes of Motion   Flexion: 4  Extension: 4  Abduction: 4  Adduction: 4-    Right Knee   Flexion: 4  Extension: 4- (increase in right knee pain)    Additional Strength Details  Mild R knee ext lag after 10x SLR    Swelling     Right Knee Girth Measurement (cm)   Joint line: 46 5 cm      Flowsheet Rows      Most Recent Value   PT/OT G-Codes   Current Score  49   Projected Score  62             Precautions: None      Manual  3/5            Right Knee PROM nv                                                                    Exercise Diary  3/5            Bike nv            Heel slides Strap  10"x10 R            Gastroc Stretch Strap  30"x3            SLR 10x R            Hip Abd s/l nv            Hip Add s/l nv            HSS Strap  30"x3 R            Standing HR nv            Mini Squats nv            Lunge Gastroc S nv            Step Ups                                                                                                                                      Modalities

## 2020-03-09 ENCOUNTER — OFFICE VISIT (OUTPATIENT)
Dept: PHYSICAL THERAPY | Facility: CLINIC | Age: 55
End: 2020-03-09
Payer: COMMERCIAL

## 2020-03-09 ENCOUNTER — TRANSCRIBE ORDERS (OUTPATIENT)
Dept: PHYSICAL THERAPY | Facility: CLINIC | Age: 55
End: 2020-03-09

## 2020-03-09 DIAGNOSIS — Z96.659 STATUS POST REPLACEMENT OF KNEE JOINT: Primary | ICD-10-CM

## 2020-03-09 DIAGNOSIS — Z96.651 TOTAL KNEE REPLACEMENT STATUS, RIGHT: ICD-10-CM

## 2020-03-09 DIAGNOSIS — Z96.659 STATUS POST KNEE REPLACEMENT, UNSPECIFIED LATERALITY: Primary | ICD-10-CM

## 2020-03-09 PROCEDURE — 97140 MANUAL THERAPY 1/> REGIONS: CPT | Performed by: PHYSICAL THERAPIST

## 2020-03-09 NOTE — PROGRESS NOTES
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    2  Total knee replacement status, right Z96 651                   Subjective: Pt reports he was on his feet a lot yesterday and noted increase in right knee pain requiring pain meds at bed time  Objective: See treatment diary below      Assessment: Pt tolerated exercises with some right knee pain noted  Added bike, LAQ, standing HR, and lunge gastroc stretch with appropriate challenge noted  Patient would benefit from continued PT      Plan: Continue per plan of care        Precautions: None      Manual  3/5 3/9           Right Knee PROM nv [facility]                                                                   Exercise Diary  3/5 3/9           Bike nv rocking  10"x5'           Heel slides Strap  10"x10 R Strap  10"x10           Gastroc Stretch Strap  30"x3 Strap  30"x3           SLR 10x R 2x10 R           Hip Abd s/l nv 10x           Hip Add s/l nv nv           HSS Strap  30"x3 R Strap  30"x3 R           Standing HR nv 10x           Mini Squats nv nv           Lunge Gastroc S nv 30"x3            Step Ups  nv           LAQ  5"x1o                                                                                                                       Modalities

## 2020-03-12 ENCOUNTER — OFFICE VISIT (OUTPATIENT)
Dept: PHYSICAL THERAPY | Facility: CLINIC | Age: 55
End: 2020-03-12
Payer: COMMERCIAL

## 2020-03-12 DIAGNOSIS — Z96.651 TOTAL KNEE REPLACEMENT STATUS, RIGHT: ICD-10-CM

## 2020-03-12 DIAGNOSIS — Z96.659 STATUS POST REPLACEMENT OF KNEE JOINT: Primary | ICD-10-CM

## 2020-03-12 PROCEDURE — 97140 MANUAL THERAPY 1/> REGIONS: CPT | Performed by: PHYSICAL THERAPIST

## 2020-03-12 PROCEDURE — 97112 NEUROMUSCULAR REEDUCATION: CPT | Performed by: PHYSICAL THERAPIST

## 2020-03-12 PROCEDURE — 97110 THERAPEUTIC EXERCISES: CPT | Performed by: PHYSICAL THERAPIST
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
## 2020-03-16 ENCOUNTER — OFFICE VISIT (OUTPATIENT)
Dept: PHYSICAL THERAPY | Facility: CLINIC | Age: 55
End: 2020-03-16
Payer: COMMERCIAL

## 2020-03-16 DIAGNOSIS — Z96.659 STATUS POST REPLACEMENT OF KNEE JOINT: Primary | ICD-10-CM

## 2020-03-16 DIAGNOSIS — Z96.651 TOTAL KNEE REPLACEMENT STATUS, RIGHT: ICD-10-CM

## 2020-03-16 PROCEDURE — 97112 NEUROMUSCULAR REEDUCATION: CPT | Performed by: PHYSICAL THERAPIST

## 2020-03-16 PROCEDURE — 97110 THERAPEUTIC EXERCISES: CPT | Performed by: PHYSICAL THERAPIST

## 2020-03-16 PROCEDURE — 97140 MANUAL THERAPY 1/> REGIONS: CPT | Performed by: PHYSICAL THERAPIST

## 2020-03-16 NOTE — PROGRESS NOTES
Daily Note     Today's date: 3/16/2020  Patient name: Selina Perez  : 1965  MRN: [id number]  Referring provider: Jose Cruz MD  Dx:   Encounter Diagnosis     ICD-10-CM    1  Status post replacement of knee joint Z96 659    2  Total knee replacement status, right Z96 651                   Subjective: Pt reports flare-up in right knee pain reported last visit and gradually subsiding, but still notes stiffness in knee  Pt able to ambulate slowly around home without [facility] yesterday  Objective: See treatment diary below      Assessment: Pt tolerated exercises with less knee pain with PROM compared to last visit  Plan to add standing exercises as tolerated next visit  Patient would benefit from continued PT      Plan: Continue per plan of care        Precautions: None      Manual  3/5 3/9 3/12 3/16         Right Knee PROM nv [facility]                                                                 Exercise Diary  3/5 3/9 3/12 3/16         Bike nv rocking  10"x5' rocking  10"x5' rocking  10"x5'         Heel slides Strap  10"x10 R Strap  10"x10 Strap  10"x10 Strap  10"x10         Gastroc Stretch Strap  30"x3 Strap  30"x3 Pain* Strap  30"x3         SLR 10x R 2x10 R 2x10 R 2x10 R         Hip Abd s/l nv 10x 10x R 30x R         Hip Add s/l nv nv 10x R 20x R         HSS Strap  30"x3 R Strap  30"x3 R manual          Standing HR nv 10x           Mini Squats nv nv           Lunge Gastroc S nv 30"x3            Step Ups  nv           LAQ  5"x10  5"x20                                                                                                                     Modalities

## 2020-03-19 ENCOUNTER — OFFICE VISIT (OUTPATIENT)
Dept: PHYSICAL THERAPY | Facility: CLINIC | Age: 55
End: 2020-03-19
Payer: COMMERCIAL

## 2020-03-19 DIAGNOSIS — Z96.659 STATUS POST REPLACEMENT OF KNEE JOINT: Primary | ICD-10-CM

## 2020-03-19 DIAGNOSIS — Z96.651 TOTAL KNEE REPLACEMENT STATUS, RIGHT: ICD-10-CM

## 2020-03-19 PROCEDURE — 97112 NEUROMUSCULAR REEDUCATION: CPT | Performed by: PHYSICAL THERAPIST

## 2020-03-19 PROCEDURE — 97110 THERAPEUTIC EXERCISES: CPT | Performed by: PHYSICAL THERAPIST

## 2020-03-19 PROCEDURE — 97140 MANUAL THERAPY 1/> REGIONS: CPT | Performed by: PHYSICAL THERAPIST

## 2020-03-19 NOTE — PROGRESS NOTES
Daily Note     Today's date: 3/19/2020  Patient name: Brooklyn Acosta  : 1965  MRN: [id number]  Referring provider: Jane Vann MD  Dx:   Encounter Diagnosis     ICD-10-CM    1  Status post replacement of knee joint Z96 659    2  Total knee replacement status, right Z96 651                   Subjective: Pt notes his right knee is starting to feel better  Pt c/o right hip pain lately affecting sleep at night  Pt also expressed concerns of lateral shifting in the knee joint that continues to occur since TKR  Objective: See treatment diary below      Assessment: Patient tolerated HR and standing knee flexion  Added step ups with notable challenge with first level and unable to eccentrically control descent  Added SKC and piriformis stretch due to right hip pain  Provided updated HEP  Patient would benefit from continued PT      Plan: Continue per plan of care        Precautions: None      Manual  3/5 3/9 3/12 3/16 3/19        Right Knee PROM nv [facility]                                                                Exercise Diary  3/5 3/9 3/12 3/16 3/19        Bike nv rocking  10"x5' rocking  10"x5' rocking  10"x5' rocking  10"x5'        Heel slides Strap  10"x10 R Strap  10"x10 Strap  10"x10 Strap  10"x10 Strap  10"x10        Gastroc Stretch Strap  30"x3 Strap  30"x3 Pain* Strap  30"x3         SLR 10x R 2x10 R 2x10 R 2x10 R 30x R        Hip Abd s/l nv 10x 10x R 30x R 30x R        Hip Add s/l nv nv 10x R 20x R 30x R        HSS Strap  30"x3 R Strap  30"x3 R manual          Standing HR nv 10x   20x        Mini Squats nv nv           Lunge Gastroc S nv 30"x3            Step Ups  nv           LAQ  5"x10  5"x20         Standing Knee flexion     15x        SKC  (hip flexor S focus)     10"x10 ea        Piriformis stretch     10"x5 R                                                                             Modalities

## 2020-03-23 ENCOUNTER — OFFICE VISIT (OUTPATIENT)
Dept: PHYSICAL THERAPY | Facility: CLINIC | Age: 55
End: 2020-03-23
Payer: COMMERCIAL

## 2020-03-23 DIAGNOSIS — Z96.659 STATUS POST REPLACEMENT OF KNEE JOINT: Primary | ICD-10-CM

## 2020-03-23 DIAGNOSIS — Z96.651 TOTAL KNEE REPLACEMENT STATUS, RIGHT: ICD-10-CM

## 2020-03-23 PROCEDURE — 97112 NEUROMUSCULAR REEDUCATION: CPT | Performed by: PHYSICAL THERAPIST

## 2020-03-23 PROCEDURE — 97116 GAIT TRAINING THERAPY: CPT | Performed by: PHYSICAL THERAPIST

## 2020-03-23 PROCEDURE — 97140 MANUAL THERAPY 1/> REGIONS: CPT | Performed by: PHYSICAL THERAPIST

## 2020-03-23 PROCEDURE — 97110 THERAPEUTIC EXERCISES: CPT | Performed by: PHYSICAL THERAPIST

## 2020-03-23 NOTE — PROGRESS NOTES
Daily Note     Today's date: 3/23/2020  Patient name: Bertha Dhillon  : 1965  MRN: [id number]  Referring provider: Hanna Patterson MD  Dx:   Encounter Diagnosis     ICD-10-CM    1  Status post replacement of knee joint Z96 659    2  Total knee replacement status, right Z96 651                   Subjective: Pt reports he has been able to walk the last few days without a cane, with some right knee pain noted post walk, but not during  Objective: See treatment diary below      Assessment: Pt able to perform step ups with RLE with greater stability, but severe weakness and challenge noted with eccentric knee flexion to step down backwards  Pt tolerated standing knee flexion and LAQ with minimal fatigue reported  Demonstrated mild antalgic gait with ambulating into the clinic, and moderate gait dysfunction with lateral trunk sway noted at end of tx session  Patient would benefit from continued PT      Plan: Continue per plan of care        Precautions: None      Manual  3/5 3/9 3/12 3/16 3/19 3/23       Right Knee PROM nv [address] [place] [address]                                                               Exercise Diary  3/5 3/9 3/12 3/16 3/19 3/23       Bike nv rocking  10"x5' rocking  10"x5' rocking  10"x5' rocking  10"x5' rocking and ROM  10"  5 min       Heel slides Strap  10"x10 R Strap  10"x10 Strap  10"x10 Strap  10"x10 Strap  10"x10        Gastroc Stretch Strap  30"x3 Strap  30"x3 Pain* Strap  30"x3  Lunge  30"x3       SLR 10x R 2x10 R 2x10 R 2x10 R 30x R 30x R       Hip Abd s/l nv 10x 10x R 30x R 30x R        Hip Add s/l nv nv 10x R 20x R 30x R        HSS Strap  30"x3 R Strap  30"x3 R manual          Standing HR nv 10x   20x 20x       Mini Squats nv nv           Lunge Gastroc S nv 30"x3            Step Ups  nv    L2  10x  Up R, down R       LAQ  5"x10  5"x20  5#  30x       Standing Knee flexion     15x 2#  20x       SKC  (hip flexor S focus)     10"x10 ea        Piriformis stretch     10"x5 R        Step Downs backwards      L1  10x R                                                               Modalities

## 2020-03-26 ENCOUNTER — OFFICE VISIT (OUTPATIENT)
Dept: PHYSICAL THERAPY | Facility: CLINIC | Age: 55
End: 2020-03-26
Payer: COMMERCIAL

## 2020-03-26 DIAGNOSIS — Z96.651 TOTAL KNEE REPLACEMENT STATUS, RIGHT: ICD-10-CM

## 2020-03-26 DIAGNOSIS — Z96.659 STATUS POST REPLACEMENT OF KNEE JOINT: Primary | ICD-10-CM

## 2020-03-26 PROCEDURE — 97110 THERAPEUTIC EXERCISES: CPT | Performed by: PHYSICAL THERAPIST

## 2020-03-26 PROCEDURE — 97116 GAIT TRAINING THERAPY: CPT | Performed by: PHYSICAL THERAPIST

## 2020-03-26 PROCEDURE — 97112 NEUROMUSCULAR REEDUCATION: CPT | Performed by: PHYSICAL THERAPIST

## 2020-03-26 PROCEDURE — 97140 MANUAL THERAPY 1/> REGIONS: CPT | Performed by: PHYSICAL THERAPIST

## 2020-03-26 NOTE — PROGRESS NOTES
Daily Note     Today's date: 3/26/2020  Patient name: Bruce Beach  : 1965  MRN: [id number]  Referring provider: Justina Lott MD  Dx:   Encounter Diagnosis     ICD-10-CM    1  Status post replacement of knee joint Z96 659    2  Total knee replacement status, right Z96 651                   Subjective: Pt reports severe right knee pain yesterday (possibly due to weather), but managed to walk 4 drives (his farthest) and climbing stairs for exercises with no increase in pain  Pt notes difficulty sleeping last night, but notes less right knee pain this morning after getting moving  Objective: See treatment diary below      Assessment: Patient was able to complete ROM on the recumbent bike after a few minutes of stretching on the recumbent bike  Patient tolerated step up and eccentric step downs backwards with right LE, which pt unable to perform last visit  Added 1 5# with SLR with minimal fatigue, but notable challenge with 2 5# with hip abd in s/l  Demonstrated severe antalgic gait with reported medial right knee pain with weight bearing post tx due to fatigue from exercises  Patient would benefit from continued PT      Plan: Continue per plan of care        Precautions: None      Manual  3/5 3/9 3/12 3/16 3/19 3/23 3/26      Right Knee [facility]OM nv [facility]                                                              Exercise Diary  3/5 3/9 3/12 3/16 3/19 3/23 3/26      Bike nv rocking  10"x5' rocking  10"x5' rocking  10"x5' rocking  10"x5' rocking and ROM  10"  5 min 5 min  rocking  1 min ROM      Heel slides Strap  10"x10 R Strap  10"x10 Strap  10"x10 Strap  10"x10 Strap  10"x10        Gastroc Stretch Strap  30"x3 Strap  30"x3 Pain* Strap  30"x3  Lunge  30"x3       SLR 10x R 2x10 R 2x10 R 2x10 R 30x R 30x R 1 5#  30x      Hip Abd s/l nv 10x 10x R 30x R 30x R  2 5#  20x      Hip Add s/l nv nv 10x R 20x R 30x R  2 5#  30x      HSS Strap  30"x3 R Strap  30"x3 R manual          Standing HR nv 10x   20x 20x 20x      Mini Squats nv nv           Lunge Gastroc S nv 30"x3      30"x3      Step Ups  nv    L2  10x  Up R, down R L2  10x R      LAQ  5"x10  5"x20  5#  30x 7 5#  5"x      Standing Knee flexion     15x 2#  20x 5#  30x      SKC  (hip flexor S focus)     10"x10 ea  10"x10 ea      Piriformis stretch     10"x5 R        Step Downs backwards      L1  10x R                                                               Modalities

## 2020-03-30 ENCOUNTER — OFFICE VISIT (OUTPATIENT)
Dept: PHYSICAL THERAPY | Facility: CLINIC | Age: 55
End: 2020-03-30
Payer: COMMERCIAL

## 2020-03-30 DIAGNOSIS — Z96.651 TOTAL KNEE REPLACEMENT STATUS, RIGHT: ICD-10-CM

## 2020-03-30 DIAGNOSIS — Z96.659 STATUS POST REPLACEMENT OF KNEE JOINT: Primary | ICD-10-CM

## 2020-03-30 PROCEDURE — 97110 THERAPEUTIC EXERCISES: CPT | Performed by: PHYSICAL THERAPIST

## 2020-03-30 PROCEDURE — 97112 NEUROMUSCULAR REEDUCATION: CPT | Performed by: PHYSICAL THERAPIST

## 2020-03-30 PROCEDURE — 97140 MANUAL THERAPY 1/> REGIONS: CPT | Performed by: PHYSICAL THERAPIST

## 2020-03-30 PROCEDURE — 97010 HOT OR COLD PACKS THERAPY: CPT | Performed by: PHYSICAL THERAPIST

## 2020-03-30 NOTE — PROGRESS NOTES
Daily Note     Today's date: 3/30/2020  Patient name: Delia Roberts  : 1965  MRN: [id number]  Referring provider: Janine Meigs, MD  Dx:   Encounter Diagnosis     ICD-10-CM    1  Status post replacement of knee joint Z96 659    2  Total knee replacement status, right Z96 651                   Subjective: Pt reports severe right medial knee pain on Thursday night lasting all Friday, making it difficult to apply any weight through RLE to walk  Pt notes not performing any HEP, except light SLR in the recliner and step ups with climbing the stairs  Pt is scheduled for f/u appointment with surgeon tomorrow  Objective: See treatment diary below      Assessment: Pt notes clicking in right knee during end-range knee flexion PROM  Performed modified treatment today due to increase in pain  Pt tolerated without increase in pain, but noted increase in pain and antalgic gait post exercises  Performed trial of CP post tx  Patient would benefit from continued PT      Plan: Continue per plan of care  Addendum 20: Patient reports surgeon instructed pt to discharge from PT and continue gains through HEP        Precautions: None      Manual  3/5 3/9 3/12 3/16 3/19 3/23 3/26 3/30     Right Knee PROM nv [facility]                                                             Exercise Diary  3/5 3/9 3/12 3/16 3/19 3/23 3/26 3/30     Bike nv rocking  10"x5' rocking  10"x5' rocking  10"x5' rocking  10"x5' rocking and ROM  10"  5 min 5 min  rocking  1 min ROM      Heel slides Strap  10"x10 R Strap  10"x10 Strap  10"x10 Strap  10"x10 Strap  10"x10   Strap  10"x10     Gastroc Stretch Strap  30"x3 Strap  30"x3 Pain* Strap  30"x3  Lunge  30"x3  Strap  30"x3     SLR 10x R 2x10 R 2x10 R 2x10 R 30x R 30x R 1 5#  30x 0#  10x     Hip Abd s/l nv 10x 10x R 30x R 30x R  2 5#  20x      Hip Add s/l nv nv 10x R 20x R 30x R  2 5#  30x iso  5"x10  10"x5     HSS Strap  30"x3 R Strap  30"x3 R manual          Standing HR nv 10x   20x 20x 20x 20x     Mini Squats nv nv           Lunge Gastroc S nv 30"x3      30"x3      Step Ups  nv    L2  10x  Up R, down R L2  10x R      LAQ  5"x10  5"x20  5#  30x 7 5#  5"x 5#  2x10     Standing Knee flexion     15x 2#  20x 5#  30x 0#     SKC  (hip flexor S focus)     10"x10 ea  10"x10 ea      Piriformis stretch     10"x5 R        Step Downs backwards      L1  10x R                                                               Modalities         3/30     CP        10 min

## 2021-01-15 ENCOUNTER — IMMUNIZATIONS (OUTPATIENT)
Dept: FAMILY MEDICINE CLINIC | Facility: HOSPITAL | Age: 56
End: 2021-01-15

## 2021-01-15 DIAGNOSIS — Z23 ENCOUNTER FOR IMMUNIZATION: Primary | ICD-10-CM

## 2021-01-15 PROCEDURE — 91300 SARS-COV-2 / COVID-19 MRNA VACCINE (PFIZER-BIONTECH) 30 MCG: CPT

## 2021-01-15 PROCEDURE — 0001A SARS-COV-2 / COVID-19 MRNA VACCINE (PFIZER-BIONTECH) 30 MCG: CPT

## 2021-02-03 ENCOUNTER — IMMUNIZATIONS (OUTPATIENT)
Dept: FAMILY MEDICINE CLINIC | Facility: HOSPITAL | Age: 56
End: 2021-02-03

## 2021-02-03 DIAGNOSIS — Z23 ENCOUNTER FOR IMMUNIZATION: Primary | ICD-10-CM

## 2021-02-03 PROCEDURE — 0002A SARS-COV-2 / COVID-19 MRNA VACCINE (PFIZER-BIONTECH) 30 MCG: CPT

## 2021-02-03 PROCEDURE — 91300 SARS-COV-2 / COVID-19 MRNA VACCINE (PFIZER-BIONTECH) 30 MCG: CPT

## 2021-04-06 ENCOUNTER — TELEPHONE (OUTPATIENT)
Dept: PAIN MEDICINE | Facility: CLINIC | Age: 56
End: 2021-04-06

## 2021-04-06 ENCOUNTER — CONSULT (OUTPATIENT)
Dept: PAIN MEDICINE | Facility: CLINIC | Age: 56
End: 2021-04-06
Payer: COMMERCIAL

## 2021-04-06 VITALS
DIASTOLIC BLOOD PRESSURE: 82 MMHG | BODY MASS INDEX: 28.25 KG/M2 | HEART RATE: 80 BPM | TEMPERATURE: 98.1 F | HEIGHT: 76 IN | SYSTOLIC BLOOD PRESSURE: 122 MMHG | WEIGHT: 232 LBS

## 2021-04-06 DIAGNOSIS — F11.20 UNCOMPLICATED OPIOID DEPENDENCE (HCC): ICD-10-CM

## 2021-04-06 DIAGNOSIS — M79.2 NEUROPATHIC PAIN: Primary | ICD-10-CM

## 2021-04-06 DIAGNOSIS — S46.011A TRAUMATIC COMPLETE TEAR OF RIGHT ROTATOR CUFF, INITIAL ENCOUNTER: ICD-10-CM

## 2021-04-06 DIAGNOSIS — G89.4 CHRONIC PAIN SYNDROME: ICD-10-CM

## 2021-04-06 DIAGNOSIS — Z79.891 LONG-TERM CURRENT USE OF OPIATE ANALGESIC: ICD-10-CM

## 2021-04-06 PROCEDURE — 99204 OFFICE O/P NEW MOD 45 MIN: CPT | Performed by: ANESTHESIOLOGY

## 2021-04-06 RX ORDER — DOCUSATE SODIUM 100 MG/1
100 CAPSULE, LIQUID FILLED ORAL 2 TIMES DAILY
Qty: 10 CAPSULE | Refills: 0 | Status: SHIPPED | OUTPATIENT
Start: 2021-04-06 | End: 2021-04-06 | Stop reason: CLARIF

## 2021-04-06 RX ORDER — GABAPENTIN 300 MG/1
300 CAPSULE ORAL
Qty: 30 CAPSULE | Refills: 1 | Status: SHIPPED | OUTPATIENT
Start: 2021-04-06

## 2021-04-06 RX ORDER — HYDROCODONE BITARTRATE AND ACETAMINOPHEN 5; 325 MG/1; MG/1
1 TABLET ORAL EVERY 12 HOURS PRN
Qty: 40 TABLET | Refills: 0 | Status: SHIPPED | OUTPATIENT
Start: 2021-04-06

## 2021-04-06 RX ORDER — DOCUSATE SODIUM 100 MG/1
100 CAPSULE, LIQUID FILLED ORAL 2 TIMES DAILY PRN
Qty: 10 CAPSULE | Refills: 0 | Status: SHIPPED | OUTPATIENT
Start: 2021-04-06

## 2021-04-06 NOTE — PROGRESS NOTES
Assessment  1  Neuropathic pain    2  Traumatic complete tear of right rotator cuff, initial encounter    3  Chronic pain syndrome    4  Uncomplicated opioid dependence ([facility] [address] )    5  Long-term current use of opiate analgesic        Plan    Patient referred from Orthopedics for pain management prior to surgery  At this point in time ibuprofen and Tylenol is not providing relief  I am starting on hydrocodone 5/3251 tablet twice daily as needed for severe pain  Continue with the ibuprofen and I did remind him to limit his acetaminophen dose while taking the hydrocodone  Colace 100 mg twice daily as needed was provided for constipation  Will start him on gabapentin 300 mg at bedtime to help with his neuropathic symptoms  I did review the potential side effects of gabapentin, not limited to, but including dizziness, drowsiness, weakness, tired feeling, nausea, diarrhea, constipation, blurred vision, headache, swelling, dry mouth; or loss of balance or coordination  An oral drug screen swab was collected at today's office visit  The swab will be sent for confirmatory testing  The drug screen is medically necessary because the patient is either dependent on opioid medication or is being considered for opioid medication therapy and the results could impact ongoing or future treatment  The drug screen is to evaluate for the presences or absence of prescribed, non-prescribed, and/or illicit drugs/substances  There are risks associated with opioid medications, including dependence, addiction and tolerance  The patient understands and agrees to use these medications only as prescribed  Potential side effects of the medications include, but are not limited to, constipation, drowsiness, addiction, impaired judgment and risk of fatal overdose if not taken as prescribed  The patient was warned against driving while taking sedation medications  Sharing medications is a felony   At this point in time, the patient is showing no signs of addiction, abuse, diversion or suicidal ideation  [place] Prescription Drug Monitoring Program report was reviewed and was appropriate     The patient understands that he will follow with his orthopedic physician after surgery for his pain management requirements  My impressions and treatment recommendations were discussed in detail with the patient who verbalized understanding and had no further questions  Discharge instructions were provided  I personally saw and examined the patient and I agree with the above discussed plan of care  This note is created using dictation transcription  It may contain typographical errors, grammatical errors, improperly dictated words, background noise and other errors      Orders Placed This Encounter   Procedures    MM OF_Alprazolam Definitive    MM OF_Amphetamine Definitive Test    MM OF_Atomoxetine Definitive Test    MM OF_Buprenorphine Definitive Test    MM OF_Carisoprodol Definitive Test    MM OF_Clonazepam Definitive    MM OF_Cocaine Definitive Test    MM OF_Codeine Definitive    MM OF_Diazepam Definitive    MM OF_Fentanyl Definitive Test    MM OF_Heroin Definitive Test    MM OF_Hydrocodone Definitive    MM OF_Hydromorphone Definitive    MM OF_Lorazepam Definitive    MM OF_MDMA Definitive Test    MM OF_Meperidine Definitive Test    MM OF_Methadone Definitive Test    MM OF_Methylphenidate Definitive Test    MM OF_Morphine Definitive    MM OF_Oxycodone Definitive Test - Oral Fluid    MM OF_Oxazepam Definitive    MM OF_Oxymorphone Definitive Test    MM OF_Temazepam Definitive    MM OF_THC Definitive Test    MM OF_Tramadol Definitive Test    MM OF_Alprazolam Definitive    MM OF_Amphetamine Definitive Test    MM OF_Atomoxetine Definitive Test    MM OF_Buprenorphine Definitive Test    MM OF_Clonazepam Definitive    MM OF_Cocaine Definitive Test    MM OF_Codeine Definitive    MM OF_Diazepam Definitive    MM OF_Fentanyl Definitive Test    MM OF_Heroin Definitive Test    MM OF_Hydrocodone Definitive    MM OF_Hydromorphone Definitive    MM OF_Lorazepam Definitive    MM OF_MDMA Definitive Test    MM OF_Meperidine Definitive Test    MM OF_Methadone Definitive Test    MM OF_Methylphenidate Definitive Test    MM OF_Morphine Definitive    MM OF_Oxazepam Definitive    MM OF_Oxycodone Definitive Test - Oral Fluid    MM OF_Oxymorphone Definitive Test    MM OF_Phencyclidine Definitive Test    MM OF_Temazepam Definitive    MM OF_THC Definitive Test    MM OF_Tramadol Definitive Test    MM ALL_Prescribed Meds and Special Instructions     New Medications Ordered This Visit   Medications    gabapentin (NEURONTIN) 300 mg capsule     Sig: Take 1 capsule (300 mg total) by mouth daily at bedtime     Dispense:  30 capsule     Refill:  1    HYDROcodone-acetaminophen (NORCO) 5-325 mg per tablet     Sig: Take 1 tablet by mouth every 12 (twelve) hours as needed for painMax Daily Amount: 2 tablets     Dispense:  40 tablet     Refill:  0    docusate sodium (COLACE) 100 mg capsule     Sig: Take 1 capsule (100 mg total) by mouth 2 (two) times a day as needed for constipation     Dispense:  10 capsule     Refill:  0     Referred By: Mckay Brewer  History of Present Illness    Valerie Chappell is a 49 y o  male  One January 11, 2021 had a fall onto his stairs in floor and up tearing his rotator cuff of his right shoulder  He followed with orthopedics and is due to have surgery but they will not write any medications aside from Tylenol and ibuprofen and he is in severe pain and is thus referred for pain management  His pain is moderate to severe he rates as 7/10 on the visual analogue Scale during the day and 10/10 and night significant interfering with daily living activities    His pain is constant describes shooting sharp achy and throbbing he reports that walking decreases symptoms while lying down and sitting aggravate them     I have personally reviewed and/or updated the patient's past medical history, past surgical history, family history, social history, current medications, allergies, and vital signs today  Review of Systems   Constitutional: Negative for fever and unexpected weight change  HENT: Negative for trouble swallowing  Eyes: Negative for visual disturbance  Respiratory: Negative for shortness of breath and wheezing  Cardiovascular: Negative for chest pain and palpitations  Gastrointestinal: Negative for constipation, diarrhea, nausea and vomiting  Endocrine: Negative for cold intolerance, heat intolerance and polydipsia  Genitourinary: Negative for difficulty urinating and frequency  Musculoskeletal: Negative for arthralgias, gait problem, joint swelling and myalgias  Skin: Negative for rash  Neurological: Negative for dizziness, seizures, syncope, weakness and headaches  Hematological: Does not bruise/bleed easily  Psychiatric/Behavioral: Negative for dysphoric mood  All other systems reviewed and are negative        Patient Active Problem List   Diagnosis    Infected abrasion of left hand    Herniated lumbar disc without myelopathy       Past Medical History:   Diagnosis Date    Anxiety     Arthritis        Past Surgical History:   Procedure Laterality Date    FOOT SURGERY      KNEE SURGERY      MN DEBRIDEMENT, SKIN, SUB-Q TISSUE,MUSCLE,BONE,=<20 SQ CM Left 4/16/2019    Procedure: DEBRIDEMENT HAND Skin, subcutaneous tissue, fascia, and muscle wound with measuring 8 mm, 8 mm in length, depth includes 2 cm ;  Surgeon: Rob Younger MD;  Location: [facility];  Service: Orthopedics    SHOULDER SURGERY         Family History   Problem Relation Age of Onset    No Known Problems Mother     No Known Problems Father        Social History     Occupational History    Not on file   Tobacco Use    Smoking status: Current Some Day Smoker    Smokeless tobacco: Never Used Substance and Sexual Activity    Alcohol use: Not Currently    Drug use: Not on file    Sexual activity: Not on file       Current Outpatient Medications on File Prior to Visit   Medication Sig    ibuprofen (MOTRIN) 400 mg tablet Take by mouth every 6 (six) hours as needed for mild pain    [DISCONTINUED] gabapentin (NEURONTIN) 100 mg capsule Take 100 mg by mouth 3 (three) times a day    [DISCONTINUED] docusate sodium (COLACE) 100 mg capsule Take 1 capsule (100 mg total) by mouth 2 (two) times a day (Patient not taking: Reported on 4/22/2019)    [DISCONTINUED] HYDROcodone-acetaminophen (NORCO) 5-325 mg per tablet Take 1 tablet by mouth 2 (two) times a day as needed for painMax Daily Amount: 2 tablets (Patient not taking: Reported on 4/29/2019)     No current facility-administered medications on file prior to visit  No Known Allergies    Physical Exam    /82 (BP Location: Left arm, Patient Position: Sitting, Cuff Size: Standard)   Pulse 80   Temp 98 1 °F (36 7 °C)   Ht 6' 4" (1 93 m)   Wt 105 kg (232 lb)   BMI 28 24 kg/m²     Constitutional: normal, well developed, well nourished, alert, in no distress and non-toxic and no overt pain behavior  Eyes: anicteric  HEENT: grossly intact  Neck: supple, symmetric, trachea midline and no masses   Pulmonary:even and unlabored  Cardiovascular:No edema or pitting edema present  Skin:Normal without rashes or lesions and well hydrated  Psychiatric:Mood and affect appropriate  Neurologic:Cranial Nerves II-XII grossly intact  Musculoskeletal:normal,   Right arm in sling  Cannot appreciate any motor deficits or sensory deficits in his right arm  Range of motion on the right was painful with positive Neer's impingement test on the right    Imaging  MRI Rt Shoulder @ [facility] 3-10-21  Impression:  1  Full-thickness tear posterior fibers of the supraspinatus tendon with marked fraying of the anterior fibers    2  There appears to be a full-thickness tear involving the superior fibers of the subscapularis tendon with intact inferior fibers  There is a 9mm calcification associated with the superior fibers which could be result of calcific tendinitis  3  Grade 1 injury infraspinatus muscle  4  Spurring at the [facility] joint  Xray Right Shoulder @ [facility] 2-3-21    Impression:     Right shoulder AP axillary and outlet show evidence of acromioclavicular but not glenohumeral arthritis  A type II acromion  Humeral head is centered on the glenoid  I have personally reviewed pertinent films in PACS and 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

## 2021-04-06 NOTE — PATIENT INSTRUCTIONS
Opioid Dependence   WHAT YOU NEED TO KNOW:   Opioids are medicines, such as morphine and codeine, used to treat pain  Dependence happens after you have used opioids regularly for a long period of time  Dependence means that your body gets used to how much medicine you take  Dependence is not the same as addiction  Addiction means that a person uses opioids to get high instead of using them to control pain  DISCHARGE INSTRUCTIONS:   Follow up with your healthcare provider or pain specialist as directed: You may need to return for other tests  You may also be referred to a specialty clinic to receive maintenance therapy medicine on a regular basis  Write down your questions so you remember to ask them during your visits  Psychological counseling and support: Your healthcare provider may recommend that you receive psychological counseling as part of your treatment plan  A specially trained healthcare provider will speak with you about your opioid dependence  They will help you find ways to become less dependent on opioids  They may also help you find resources for any daily living needs you have, such as housing or employment  Contact your healthcare provider if:   · Your speech is slurred  · You have difficulty staying awake  · You have nausea and vomiting  · You are easily upset or cry easily  · You have poor balance  · You have questions or concerns about your condition or care  Return to the emergency department if:   · You feel lightheaded or faint  · You have a fast, slow, or irregular heartbeat  · You have a seizure  © 2017 [address] [employer] Information is for End User's use only and may not be sold, redistributed or otherwise used for commercial purposes  All illustrations and images included in CareNotes® are the copyrighted property of [employer]  or Zaheer Callejas  The above information is an  only   It is not intended as medical advice for individual conditions or treatments  Talk to your doctor, nurse or pharmacist before following any medical regimen to see if it is safe and effective for you

## 2021-04-06 NOTE — TELEPHONE ENCOUNTER
[place]-pharmacist from [facility] called stating Arnel Lambert med can only be filled for 7 day supply due to medical insurance coverage, as oppose to 20 day  Patient may require prior authorization for remaining quantity   Please advise, césarx    Call back# [phone]

## 2021-04-08 LAB
AMPHET SAL QL CFM: NEGATIVE NG/ML
BUPRENORPHINE SAL QL SCN: NEGATIVE NG/ML
CARBOXYTHC SAL QL CFM: NEGATIVE NG/ML
CCP IGG SERPL-ACNC: NEGATIVE
COCAINE SAL QL CFM: NEGATIVE NG/ML
CODEINE SAL QL CFM: NEGATIVE NG/ML
EDDP SAL QL CFM: NEGATIVE NG/ML
HYDROCODONE SAL QL CFM: NEGATIVE NG/ML
HYDROCODONE SAL QL CFM: NEGATIVE NG/ML
HYDROMORPHONE SAL QL CFM: NEGATIVE NG/ML
LEUKEMIA MARKERS BLD-IMP: NEGATIVE NG/ML
M PROTEIN 3 UR ELPH-MCNC: ABNORMAL NG/ML
M TB TUBERC IGNF/MITOGEN IGNF CONTROL: NEGATIVE NG/ML
METHADONE SAL QL CFM: NEGATIVE NG/ML
MORPHINE SAL QL CFM: NEGATIVE NG/ML
MORPHINE SAL QL CFM: NEGATIVE NG/ML
OXYMORPHONE SAL QL CFM: NEGATIVE NG/ML
OXYMORPHONE SAL QL CFM: NEGATIVE NG/ML
SL AMB 6-MAM (HEROIN METABOLITE) QUANTIFICATION: NEGATIVE NG/ML
SL AMB ALPRAZOLAM QUANTIFICATION: NEGATIVE NG/ML
SL AMB ATOMOXETINE METABOLITE QUANTIFICATION: NEGATIVE
SL AMB ATOMOXETINE QUANTIFICATION: NEGATIVE
SL AMB CARISOPRODOL QUANTIFICATION: NEGATIVE NG/ML
SL AMB CLONAZEPAM QUANTIFICATION: NEGATIVE NG/ML
SL AMB DIAZEPAM QUANTIFICATION: NEGATIVE NG/ML
SL AMB FENTANYL QUANTIFICATION: NEGATIVE NG/ML
SL AMB MDMA QUANTIFICATION: NEGATIVE NG/ML
SL AMB MEPROBAMATE QUANTIFICATION: NEGATIVE NG/ML
SL AMB N-DESMETHYL-TRAMADOL QUANTIFICATION SALIVA: NEGATIVE NG/ML
SL AMB NORBUPRENORPHINE QUANTIFICATION: NEGATIVE NG/ML
SL AMB NORDIAZEPAM QUANTIFICATION: NEGATIVE NG/ML
SL AMB NORFENTANYL QUANTIFICATION: NEGATIVE NG/ML
SL AMB NORHYDROCODONE QUANTIFICATION: NEGATIVE NG/ML
SL AMB NORHYDROCODONE QUANTIFICATION: NEGATIVE NG/ML
SL AMB NORMEPERIDINE QUANTIFICATION: NEGATIVE NG/ML
SL AMB NOROXYCODONE QUANTIFICATION: ABNORMAL NG/ML
SL AMB O-DESMETHYL-TRAMADOL QUANTIFICATION: NEGATIVE NG/ML
SL AMB OXAZEPAM QUANTIFICATION: NEGATIVE NG/ML
SL AMB RITALINIC ACID QUANTIFICATION: NEGATIVE
SL AMB TEMAZEPAM QUANTIFICATION: NEGATIVE NG/ML
SL AMB TEMAZEPAM QUANTIFICATION: NEGATIVE NG/ML
SL AMB TRAMADOL QUANTIFICATION: NEGATIVE NG/ML
SQUAMOUS #/AREA URNS HPF: NEGATIVE NG/ML

## 2024-12-12 ENCOUNTER — APPOINTMENT (OUTPATIENT)
Dept: RADIOLOGY | Facility: CLINIC | Age: 59
End: 2024-12-12
Payer: COMMERCIAL

## 2024-12-12 ENCOUNTER — CONSULT (OUTPATIENT)
Dept: PAIN MEDICINE | Facility: CLINIC | Age: 59
End: 2024-12-12
Payer: COMMERCIAL

## 2024-12-12 VITALS
DIASTOLIC BLOOD PRESSURE: 90 MMHG | SYSTOLIC BLOOD PRESSURE: 140 MMHG | WEIGHT: 315 LBS | BODY MASS INDEX: 38.36 KG/M2 | HEART RATE: 78 BPM | TEMPERATURE: 98.7 F | HEIGHT: 76 IN

## 2024-12-12 DIAGNOSIS — Z87.39 H/O BURNING PAIN IN LEG: ICD-10-CM

## 2024-12-12 DIAGNOSIS — G89.29 CHRONIC BILATERAL LOW BACK PAIN WITHOUT SCIATICA: ICD-10-CM

## 2024-12-12 DIAGNOSIS — G89.29 CHRONIC BILATERAL LOW BACK PAIN WITHOUT SCIATICA: Primary | ICD-10-CM

## 2024-12-12 DIAGNOSIS — M54.50 CHRONIC BILATERAL LOW BACK PAIN WITHOUT SCIATICA: Primary | ICD-10-CM

## 2024-12-12 DIAGNOSIS — M54.50 CHRONIC BILATERAL LOW BACK PAIN WITHOUT SCIATICA: ICD-10-CM

## 2024-12-12 PROCEDURE — 72110 X-RAY EXAM L-2 SPINE 4/>VWS: CPT

## 2024-12-12 PROCEDURE — 99204 OFFICE O/P NEW MOD 45 MIN: CPT | Performed by: ANESTHESIOLOGY

## 2024-12-12 RX ORDER — PREDNISONE 5 MG/1
5 TABLET ORAL DAILY
COMMUNITY

## 2024-12-12 RX ORDER — ROSUVASTATIN CALCIUM 5 MG/1
5 TABLET, COATED ORAL
COMMUNITY
Start: 2024-11-12

## 2024-12-12 RX ORDER — GABAPENTIN 100 MG/1
CAPSULE ORAL
Qty: 90 CAPSULE | Refills: 0 | Status: SHIPPED | OUTPATIENT
Start: 2024-12-12

## 2024-12-12 NOTE — PROGRESS NOTES
Assessment  1. Chronic bilateral low back pain without sciatica    2. H/O burning pain in leg        Plan    Near history of low back pain without weakness but burning in both his legs he denies bowel bladder dysfunction.    My recommendations are as follows:    Have the patient undergo a course of physical therapy, lumbar stabilization Usman approach, prescription was provided.    Will obtain lumbar radiographs to rule any acute pathology.    Patient is to contact our office or present to hospital Emergency Room if experience worsening or new low back/lower extremity pain, sensory or motor change, bladder or bowel dysfunction, or other neurological change.    Patient on gabapentin to help with his neuropathic symptoms.  Significant or fearing with his daily living activities.  I did review the potential side effects of gabapentin, not limited to, but including dizziness, drowsiness, weakness, tired feeling, nausea, diarrhea, constipation, blurred vision, headache, swelling, dry mouth, or loss of balance or coordination.  Rubs or questions or give her office a call.    Follow-up with the patient to 4 weeks certainly sooner if needed.    My impressions and treatment recommendations were discussed in detail with the patient who verbalized understanding and had no further questions.  Discharge instructions were provided. I personally saw and examined the patient and I agree with the above discussed plan of care.    Orders Placed This Encounter   Procedures    X-ray lumbar spine complete 4+ views     Standing Status:   Future     Expected Date:   12/12/2024     Expiration Date:   12/12/2028     Scheduling Instructions:      Bring along any outside films relating to this procedure.          Ambulatory referral to Physical Therapy     Standing Status:   Future     Expiration Date:   12/12/2025     Referral Priority:   Routine     Referral Type:   Physical Therapy     Referral Reason:   Specialty Services Required      Requested Specialty:   Physical Therapy     Number of Visits Requested:   1     Expiration Date:   12/12/2025     New Medications Ordered This Visit   Medications    predniSONE 5 mg tablet     Sig: Take 5 mg by mouth daily    rosuvastatin (CRESTOR) 5 mg tablet     Sig: Take 5 mg by mouth daily at bedtime    gabapentin (NEURONTIN) 100 mg capsule     Sig: Start with 1 pill at bedtime for 5 days then 1 pill twice a day for 5 days then 1 pill 3 times a day     Dispense:  90 capsule     Refill:  0      Referring: Dasha Chavira DO     History of Present Illness    Emmett Guzman is a 59 y.o. male history of low back pain.  He is unaware of any clear precipitant event denies any trauma or injury.  He states this may be secondary to injury in 2021.  He was seen approximately 7 years ago for low back pain and radicular symptoms underwent epidural steroid injections with excellent relief.  He reports this is different in nature and character.  It is severe he denies any weakness he rates his pain as 5 out of 10 on the visual and scale denies any bowel bladder dysfunction.  He does have shooting and numbness in both his legs that are equal.  All activities aggravate his symptoms well nothing seems to decrease his pain.  He was recent started on oral steroids without significant relief.    I have personally reviewed and/or updated the patient's past medical history, past surgical history, family history, social history, current medications, allergies, and vital signs today.     Review of Systems   Respiratory:  Negative for shortness of breath.    Cardiovascular:  Negative for chest pain.   Gastrointestinal:  Negative for constipation, diarrhea, nausea and vomiting.   Musculoskeletal:  Negative for arthralgias, gait problem, joint swelling and myalgias.   Skin:  Negative for rash.   Neurological:  Negative for dizziness, seizures and weakness.   All other systems reviewed and are negative.      Patient Active Problem List  "  Diagnosis    Infected abrasion of left hand    Herniated lumbar disc without myelopathy       Past Medical History:   Diagnosis Date    Anxiety     Arthritis        Past Surgical History:   Procedure Laterality Date    FOOT SURGERY      KNEE SURGERY      [place] DEBRIDEMENT BONE 1ST 20 SQ CM/< Left 4/16/2019    Procedure: DEBRIDEMENT HAND Skin, subcutaneous tissue, fascia, and muscle wound with measuring 8 mm, 8 mm in length, depth includes 2 cm.;  Surgeon: Ty Iglesias MD;  [facility];  Service: Orthopedics    SHOULDER SURGERY         Family History   Problem Relation Age of Onset    No Known Problems Mother     No Known Problems Father        Social History     Occupational History    Not on file   Tobacco Use    Smoking status: Some Days    Smokeless tobacco: Never   Substance and Sexual Activity    Alcohol use: Not Currently    Drug use: Not on file    Sexual activity: Not on file       Current Outpatient Medications on File Prior to Visit   Medication Sig    predniSONE 5 mg tablet Take 5 mg by mouth daily    rosuvastatin (CRESTOR) 5 mg tablet Take 5 mg by mouth daily at bedtime    [DISCONTINUED] docusate sodium (COLACE) 100 mg capsule Take 1 capsule (100 mg total) by mouth 2 (two) times a day as needed for constipation    [DISCONTINUED] gabapentin (NEURONTIN) 300 mg capsule Take 1 capsule (300 mg total) by mouth daily at bedtime    [DISCONTINUED] HYDROcodone-acetaminophen (NORCO) 5-325 mg per tablet Take 1 tablet by mouth every 12 (twelve) hours as needed for painMax Daily Amount: 2 tablets (Patient not taking: Reported on 12/12/2024)    [DISCONTINUED] ibuprofen (MOTRIN) 400 mg tablet Take by mouth every 6 (six) hours as needed for mild pain (Patient not taking: Reported on 12/12/2024)     No current facility-administered medications on file prior to visit.       No Known Allergies    Physical Exam    /90   Pulse 78   Temp 98.7 °F (37.1 °C)   Ht 6' 4\" (1.93 m)   Wt (!) 282 kg (621 lb)   " BMI 75.59 kg/m²     Constitutional: normal, well developed, well nourished, alert, in no distress and non-toxic and no overt pain behavior. and overweight  Eyes: anicteric  HEENT: grossly intact  Neck: supple, symmetric, trachea midline and no masses   Pulmonary:even and unlabored  Cardiovascular:No edema or pitting edema present  Skin:Normal without rashes or lesions and well hydrated  Psychiatric:Mood and affect appropriate  Neurologic:Cranial Nerves II-XII grossly intact  Musculoskeletal:normal, difficulty going from sitting to standing sitting position; no obvious skin lesions or erythema lumbar sacral spine; tenderness to lumbar paravertebrals; deep tendon reflexes are diminished but symmetrical bilateral patellar and achilles; no focal motor deficit appreciated lower limbs; bilateral straight leg raising.

## 2024-12-13 ENCOUNTER — TELEPHONE (OUTPATIENT)
Dept: PAIN MEDICINE | Facility: CLINIC | Age: 59
End: 2024-12-13

## 2024-12-13 NOTE — TELEPHONE ENCOUNTER
----- Message from Buck Moreira DO sent at 12/12/2024  2:21 PM EST -----  X-ray showed low lumbar facet arthrosis with disc degeneration no acute findings continue with current treatment plan as discussed today

## 2025-06-17 NOTE — PROGRESS NOTES
Adult Annual Physical  Name: Emmett Guzman      : 1965      MRN: [id number]  Encounter Provider: Claudia Hernández DO  Encounter Date: 2025   Encounter department: [facility] PRIMARY CARE    :  Assessment & Plan  Annual physical exam         KESHAWN on CPAP  [facility] ([facility] lung)  -Severe Obstructive sleep apnea: HST on 3/06/2023 shows severe AHI of 44.5 events/hour. Jesus oxygen saturation 88%.   Not using cpap as he is not able to tolerate  Should f/u with pulmonologist.          Annual physical exam                   Immunizations:  - Immunizations due: Prevnar 20 and Zoster (Shingrix)         History of Present Illness   {?Quick Links Encounters * My Last Note * Last Note in Specialty * Snapshot * Since Last Visit * History :02975}  Adult Annual Physical  Patient is a 60 year old male who is being seen today as a new patient for physical exam.   Previous PCP=Dasha Chavira DO  No records for review  Colon cancer screening ***  PSA ***  Lung cancer screen CT ***    Seeing pain management at [facility] for his herniated lumbar disc with myelopathy. On gabapentin.     Has KESHAWN and is prescribed cpap but not able to tolerate. Sees [facility] group        Review of Systems  {Select to Display PMH (Optional):42625}    Objective {?Quick Links Trend Vitals * Enter New Vitals * Results Review * Timeline (Adult) * Labs * Imaging * Cardiology * Procedures * Lung Cancer Screening * Surgical eConsent :74498}  /84   Pulse 88   Wt 124 kg (273 lb 12.8 oz)   SpO2 95%   BMI 33.33 kg/m²     Physical Exam  {Administrative / Billing Section (Optional):32006}

## 2025-06-18 ENCOUNTER — OFFICE VISIT (OUTPATIENT)
Dept: FAMILY MEDICINE CLINIC | Facility: CLINIC | Age: 60
End: 2025-06-18
Payer: COMMERCIAL

## 2025-06-18 VITALS
OXYGEN SATURATION: 95 % | HEART RATE: 88 BPM | WEIGHT: 273.8 LBS | BODY MASS INDEX: 33.33 KG/M2 | DIASTOLIC BLOOD PRESSURE: 84 MMHG | SYSTOLIC BLOOD PRESSURE: 123 MMHG

## 2025-06-18 DIAGNOSIS — Z72.0 TOBACCO USER: ICD-10-CM

## 2025-06-18 DIAGNOSIS — M51.26 HERNIATED LUMBAR DISC WITHOUT MYELOPATHY: ICD-10-CM

## 2025-06-18 DIAGNOSIS — Z76.89 ENCOUNTER TO ESTABLISH CARE WITH NEW DOCTOR: Primary | ICD-10-CM

## 2025-06-18 DIAGNOSIS — E66.811 CLASS 1 OBESITY WITH SERIOUS COMORBIDITY AND BODY MASS INDEX (BMI) OF 33.0 TO 33.9 IN ADULT, UNSPECIFIED OBESITY TYPE: ICD-10-CM

## 2025-06-18 DIAGNOSIS — G47.33 OSA ON CPAP: ICD-10-CM

## 2025-06-18 PROBLEM — S60.512A INFECTED ABRASION OF LEFT HAND: Status: RESOLVED | Noted: 2019-04-15 | Resolved: 2025-06-18

## 2025-06-18 PROBLEM — H90.12 CONDUCTIVE HEARING LOSS OF LEFT EAR WITH UNRESTRICTED HEARING OF RIGHT EAR: Status: RESOLVED | Noted: 2025-06-18 | Resolved: 2025-06-18

## 2025-06-18 PROBLEM — H90.12 CONDUCTIVE HEARING LOSS OF LEFT EAR WITH UNRESTRICTED HEARING OF RIGHT EAR: Status: ACTIVE | Noted: 2025-06-18

## 2025-06-18 PROBLEM — E66.9 OBESITY: Status: ACTIVE | Noted: 2025-06-18

## 2025-06-18 PROBLEM — L08.9 INFECTED ABRASION OF LEFT HAND: Status: RESOLVED | Noted: 2019-04-15 | Resolved: 2025-06-18

## 2025-06-18 PROBLEM — F41.9 ANXIETY: Status: ACTIVE | Noted: 2023-03-30

## 2025-06-18 PROCEDURE — 99203 OFFICE O/P NEW LOW 30 MIN: CPT | Performed by: FAMILY MEDICINE

## 2025-06-18 RX ORDER — TIRZEPATIDE 2.5 MG/.5ML
2.5 INJECTION, SOLUTION SUBCUTANEOUS WEEKLY
Qty: 2 ML | Refills: 0 | Status: SHIPPED | OUTPATIENT
Start: 2025-06-18 | End: 2025-07-16

## 2025-06-18 NOTE — ASSESSMENT & PLAN NOTE
[facility] ([facility] asthma [facility] lung)  -Severe Obstructive sleep apnea: HST on 3/06/2023 shows severe AHI of 44.5 events/hour. Jesus oxygen saturation 88%.   Not using cpap as he is not able to tolerate  Should f/u with pulmonologist.

## 2025-06-18 NOTE — ASSESSMENT & PLAN NOTE
Prior Authorization Clinical Questions for Weight Management Pharmacotherapy    1. Does the patient have a contrainidcation to medication prescribed for weight management?: No  2. Does the patient have a diagnosis of obesity, confirmed by a BMI greater than or equal to 30 kg/m^2?: Yes  3. Does the patient have a BMI of greater than or equal to 27 kg/m^2 with at least one weight-related comorbidity/risk factor/complication (e.g. diabetes, dyslipidemia, coronary artery disease)?: No  4. Weight-related co-morbidities/risk factors: obstructive sleep apnea (KESHAWN)  5. WEGOVY CVA Indication: Does patient have established documented cardiovascular disease (history of a prior heart attack (myocardial infarction), stroke, or symptomatic peripheral arterial disease (PAD)?: No  6. ZEPBOUND KESHAWN Indication: Does patient have documented KESHAWN diagnosed via sleep study (insurance will require copy of sleep study results for approval)?: Yes  7. Has the patient been on a weight loss regimen of low-calorie diet, increased physical activity, and lifestyle modifications for a minimum of 6 months?: No  8. Has the patient completed a comprehensive weight loss program (ie, Weight Watchers, Noom, Bariatrics, other clarice on phone)? If so, what?: No  9. Does the patient have a history of type 2 diabetes?: No  10. Has the member tried and failed other weight loss medication within the past 12 months?: No  11. Will the member use requested medication in combination with another GLP agonist or weight loss drug?: No  12. Is the medication a controlled substance?: No     Baseline weight (in pounds): 273.8 lbs  Current weight (in pounds): 273.8 lbs  Weight loss percentage: 0%

## 2025-06-18 NOTE — PROGRESS NOTES
Name: Emmett Guzman      : 1965      MRN: [id number]  Encounter Provider: Claudia Hernández DO  Encounter Date: 2025   Encounter department: [facility] PRIMARY CARE    Assessment & Plan  Encounter to establish care with new doctor         KESHAWN on CPAP         Herniated lumbar disc without myelopathy  Previously saw pain management at [facility]  On on meds.          Tobacco user    Orders:  •  CT Lung Screening Program; Future    Class 1 obesity with serious comorbidity and body mass index (BMI) of 33.0 to 33.9 in adult, unspecified obesity type  Prior Authorization Clinical Questions for Weight Management Pharmacotherapy    1. Does the patient have a contrainidcation to medication prescribed for weight management?: No  2. Does the patient have a diagnosis of obesity, confirmed by a BMI greater than or equal to 30 kg/m^2?: Yes  3. Does the patient have a BMI of greater than or equal to 27 kg/m^2 with at least one weight-related comorbidity/risk factor/complication (e.g. diabetes, dyslipidemia, coronary artery disease)?: No  4. Weight-related co-morbidities/risk factors: obstructive sleep apnea (KESHAWN)  5. WEGOVY CVA Indication: Does patient have established documented cardiovascular disease (history of a prior heart attack (myocardial infarction), stroke, or symptomatic peripheral arterial disease (PAD)?: No  6. ZEPBOUND KESHAWN Indication: Does patient have documented KESHAWN diagnosed via sleep study (insurance will require copy of sleep study results for approval)?: Yes  7. Has the patient been on a weight loss regimen of low-calorie diet, increased physical activity, and lifestyle modifications for a minimum of 6 months?: No  8. Has the patient completed a comprehensive weight loss program (ie, Weight Watchers, Noom, Bariatrics, other clarice on phone)? If so, what?: No  9. Does the patient have a history of type 2 diabetes?: No  10. Has the member tried and failed other weight loss medication  within the past 12 months?: No  11. Will the member use requested medication in combination with another GLP agonist or weight loss drug?: No  12. Is the medication a controlled substance?: No     Baseline weight (in pounds): 273.8 lbs  Current weight (in pounds): 273.8 lbs  Weight loss percentage: 0%                   History of Present Illness     HPIPatient is a 60 year old male who is being seen today as a new patient to establish care.     Previous PCP=Dasha Chavira DO at [facility].   No records for review  Colon cancer screening reportedly up to date. Had at age 57 at  [facility]   Patient is not sure if he has had PSA/screening labs in the last year. Seems to think he has  Lung cancer screen CT is due. He thinks he has had done before.     Seeing pain management at [facility] for his herniated lumbar disc with myelopathy. Had 3 epidurals and was on gabapentin. Doing better.   Sees a cardiologist in [place] for pre-op clearance. No records for review.   Dr park is ortho for arthroscopic surgery in left shoulder.   Has KESHAWN and is prescribed cpap but not able to tolerate. [facility]  Previously on zepbound for his obesity as prescribed by online provider. Would like to resume using this    Review of Systems  Past Medical History[1]  Past Surgical History[2]  Family History[3]  Social History[4]  Medications[5]  No Known Allergies  Immunization History   Administered Date(s) Administered   • COVID-19 PFIZER VACCINE 0.3 ML IM 01/15/2021, 02/03/2021   • INFLUENZA 01/30/2020   • Tdap 04/13/2019     Objective   /84   Pulse 88   Wt 124 kg (273 lb 12.8 oz)   SpO2 95%   BMI 33.33 kg/m²     Physical Exam  Vitals and nursing note reviewed.   Constitutional:       General: He is not in acute distress.     Appearance: Normal appearance. He is obese. He is not ill-appearing, toxic-appearing or diaphoretic.   HENT:      Head: Normocephalic and atraumatic.      Right Ear:  Tympanic membrane normal.      Left Ear: Tympanic membrane normal.      Nose: Nose normal.     Eyes:      Conjunctiva/sclera: Conjunctivae normal.       Cardiovascular:      Rate and Rhythm: Normal rate and regular rhythm.      Heart sounds: No murmur heard.  Pulmonary:      Effort: Pulmonary effort is normal.      Breath sounds: Normal breath sounds.   Abdominal:      General: There is no distension.      Palpations: Abdomen is soft. There is no mass.      Tenderness: There is no abdominal tenderness.     Musculoskeletal:      Cervical back: Normal range of motion and neck supple.      Right lower leg: No edema.      Left lower leg: No edema.   Lymphadenopathy:      Cervical: No cervical adenopathy.     Skin:     Findings: No rash.     Neurological:      General: No focal deficit present.      Mental Status: He is alert and oriented to person, place, and time.     Psychiatric:         Mood and Affect: Mood normal.              [1]  Past Medical History:  Diagnosis Date   • Anxiety    • Arthritis    • KESHAWN (obstructive sleep apnea)    [2]  Past Surgical History:  Procedure Laterality Date   • ANTERIOR CRUCIATE LIGAMENT REPAIR     • FOOT SURGERY     • KNEE SURGERY Bilateral     bilateral knee replacements   • [place] DEBRIDEMENT BONE 1ST 20 SQ CM/< Left 04/16/2019    Procedure: DEBRIDEMENT HAND Skin, subcutaneous tissue, fascia, and muscle wound with measuring 8 mm, 8 mm in length, depth includes 2 cm.;  Surgeon: Ty Iglesias MD;  Location: [facility];  Service: Orthopedics   • SHOULDER SURGERY Bilateral     2 on right 1 on left   [3]  Family History  Problem Relation Name Age of Onset   • No Known Problems Mother     • Dementia Father     [4]  Social History  Tobacco Use   • Smoking status: Some Days     Current packs/day: 0.10     Average packs/day: 1.5 packs/day for 39.2 years (58.5 ttl pk-yrs)     Types: Cigarettes     Start date: 1986     Last attempt to quit: 1/1/2025   • Smokeless tobacco: Never   Vaping Use    • Vaping status: Never Used   Substance and Sexual Activity   • Alcohol use: Yes     Alcohol/week: 15.0 standard drinks of alcohol     Types: 15 Shots of liquor per week     Comment: mostly on the weekends- captain and coke; 6-12 drinks over course of weekend   • Drug use: Never   [5]  Current Outpatient Medications on File Prior to Visit   Medication Sig   • [DISCONTINUED] Clotrimazole 1 % LOTN Apply 1 Application topically in the morning and 1 Application in the evening. (Patient not taking: Reported on 6/18/2025)   • [DISCONTINUED] gabapentin (NEURONTIN) 100 mg capsule Start with 1 pill at bedtime for 5 days then 1 pill twice a day for 5 days then 1 pill 3 times a day (Patient not taking: Reported on 6/18/2025)   • [DISCONTINUED] predniSONE 5 mg tablet Take 5 mg by mouth daily (Patient not taking: Reported on 6/18/2025)   • [DISCONTINUED] rosuvastatin (CRESTOR) 5 mg tablet Take 5 mg by mouth daily at bedtime (Patient not taking: Reported on 6/18/2025)

## 2025-06-19 ENCOUNTER — TELEPHONE (OUTPATIENT)
Dept: ADMINISTRATIVE | Facility: OTHER | Age: 60
End: 2025-06-19

## 2025-06-19 ENCOUNTER — TELEPHONE (OUTPATIENT)
Age: 60
End: 2025-06-19

## 2025-06-19 NOTE — LETTER
Diabetic Eye Exam Form    Date Requested: 25  Patient: Emmett Guzman  Patient : 1965   Referring Provider: Claudia Hernández DO      DIABETIC Eye Exam Date _______________________________      Type of Exam MUST be documented for Diabetic Eye Exams. Please CHECK ONE.     Retinal Exam       Dilated Retinal Exam       OCT       Optomap-Iris Exam      Fundus Photography       Left Eye - Please check Retinopathy or No Retinopathy        Exam did show retinopathy    Exam did not show retinopathy       Right Eye - Please check Retinopathy or No Retinopathy       Exam did show retinopathy    Exam did not show retinopathy       Comments __________________________________________________________    Practice Providing Exam ______________________________________________    Exam Performed By (print name) _______________________________________      Provider Signature ___________________________________________________      These reports are needed for  compliance.    Please fax this completed form and a copy of the Diabetic Eye Exam report to the [facility] Based Department as soon as possible via Fax [phone], attention Ariel: Phone [phone]. Our office is located at [address] [place] [address].     We thank you for your assistance in treating our mutual patient.

## 2025-06-19 NOTE — LETTER
Procedure Request Form: Colonoscopy      Date Requested: 25  Patient: Emmett D Stempel  Patient : 1965   Referring Provider: Claudia Hernández, DO        Date of Procedure ______________________________       The above patient has informed us that they have completed their   most recent Colonoscopy at your facility. Please complete   this form and attach all corresponding procedure reports/results.    Comments __________________________________________________________  ____________________________________________________________________  ____________________________________________________________________  ____________________________________________________________________    Facility Completing Procedure _________________________________________    Form Completed By (print name) _______________________________________      Signature __________________________________________________________      These reports are needed for  compliance.    Please fax this completed form and a copy of the procedure report to the [facility] Based Department as soon as possible via Fax [phone], tayo George: Phone [phone]. Our office is located at [address] [place] [address].    We thank you for your assistance in treating our mutual patient.

## 2025-06-19 NOTE — TELEPHONE ENCOUNTER
Upon review of the In Basket request and the patient's chart, initial outreach has been made via fax to facility. Please see Contacts section for details.     Thank you  Ariel Cuadra MA

## 2025-06-19 NOTE — TELEPHONE ENCOUNTER
----- Message from Stacy PICKARD sent at 6/18/2025  2:34 PM EDT -----  Regarding: Care Gap Request  08/01/24 8:48 AM    Hello, our patient No patient name on file. has had CRC: Colonoscopy completed/performed. Please assist in updating the patient chart by making an External outreach to [facility] facility located in [facility]. The date of service is 2021/2022.    Thank you,  Ashleigh Fothergill  [facility]

## 2025-06-19 NOTE — TELEPHONE ENCOUNTER
PA for Zepbound) 2.5 mg/0.5 mL SUBMITTED to [employer]     via      [x][email or web address]-Case ID # [id number]    [x]PA sent as URGENT    All office notes, labs and other pertaining documents and studies sent. Clinical questions answered. Awaiting determination from insurance company.     Turnaround time for your insurance to make a decision on your Prior Authorization can take 7-21 business days.

## 2025-06-23 NOTE — TELEPHONE ENCOUNTER
PA for  Zepbound) 2.5 mg/0.5 mL  APPROVED     Date(s) approved June 20, 2025 to January 20, 2026     Case # [id number]     Patient advised by          []MyChart Message  [x]Phone call   []LMOM  []L/M to call office as no active Communication consent on file  []Unable to leave detailed message as VM not approved on Communication consent       Pharmacy advised by    [x]Fax  []Phone call  []Secure Chat    Specialty Pharmacy    []     Approval letter scanned into Media Yes

## 2025-06-24 PROBLEM — I25.10 CAD (CORONARY ARTERY DISEASE): Status: ACTIVE | Noted: 2025-06-24

## 2025-06-24 NOTE — TELEPHONE ENCOUNTER
As a follow-up, a second attempt has been made for outreach via fax to facility. Please see Contacts section for details.    Thank you  Ariel Cuadra MA

## 2025-07-01 NOTE — TELEPHONE ENCOUNTER
As a final attempt, a third outreach has been made via telephone call to facility. Please see Contacts section for details. This encounter will be closed and completed by end of day. Should we receive the requested information because of previous outreach attempts, the requested patient's chart will be updated appropriately.     Thank you  Ariel Cuadra MA

## 2025-07-07 NOTE — TELEPHONE ENCOUNTER
Called facility. Will be faxing over report that was last done in 2019.    Fax received and will be sent to [facility] to be added to media. Can then be updated.

## 2025-07-10 DIAGNOSIS — E66.811 CLASS 1 OBESITY WITH SERIOUS COMORBIDITY AND BODY MASS INDEX (BMI) OF 33.0 TO 33.9 IN ADULT, UNSPECIFIED OBESITY TYPE: ICD-10-CM

## 2025-07-11 ENCOUNTER — TELEPHONE (OUTPATIENT)
Dept: ADMINISTRATIVE | Facility: OTHER | Age: 60
End: 2025-07-11

## 2025-07-11 NOTE — TELEPHONE ENCOUNTER
----- Message from Stacy PICKARD sent at 7/10/2025 10:41 AM EDT -----  Regarding: Care Gap Request  07/10/25 10:41 AM    Hello, our patient Emmett Guzman has had CRC: Colonoscopy completed/performed. Please assist in updating the patient chart by pulling the document from the Media Tab. The date of service is 2019.     Thank you,  Ashleigh Fothergill  [facility] PRIMARY CARE

## 2025-07-11 NOTE — TELEPHONE ENCOUNTER
Upon review of the In Basket request we were able to note that no further action is required. The patient chart is up to date as a result of a previous request.      Any additional questions or concerns should be emailed to the Practice Liaisons via the appropriate education email address, please do not reply via In Basket.    Thank you  Adeola Zarate MA   [facility]

## 2025-07-14 RX ORDER — TIRZEPATIDE 2.5 MG/.5ML
INJECTION, SOLUTION SUBCUTANEOUS
Qty: 4 ML | Refills: 0 | Status: SHIPPED | OUTPATIENT
Start: 2025-07-14 | End: 2025-07-17

## 2025-07-16 NOTE — PROGRESS NOTES
Name: Emmett Guzman      : 1965      MRN: [id number]  Encounter Provider: Claudia Hernández DO  Encounter Date: 2025   Encounter department: [facility]    Assessment & Plan  Medication management  Is doing well on the zepbound 2.5 mg and has lost 6 lbs (down from 273 at time of medication initiation).   Is exercising regularly as well which I encouraged him to continue   Recheck in 1 month   Orders:    tirzepatide (Zepbound) 5 mg/0.5 mL auto-injector; Inject 0.5 mL (5 mg total) under the skin once a week    Class 1 obesity with serious comorbidity and body mass index (BMI) of 30.0 to 30.9 in adult, unspecified obesity type      Orders:    tirzepatide (Zepbound) 5 mg/0.5 mL auto-injector; Inject 0.5 mL (5 mg total) under the skin once a week         History of Present Illness     HPI  Patient is a 60 year old male with CAD, KESHAWN on CPAP, anxiety, obesity who is being seen today for f/u visit /medication management  Seen here as a new patient on 25   Baseline weight (in pounds): 273.8 lbs started on zepbound.   Down 6 lbs in the last month. Tolerating well with no GERD, nausea, abdominal pain or constipation.     Review of Systems  Past Medical History[1]  Past Surgical History[2]  Family History[3]  Social History[4]  Medications[5]  No Known Allergies  Immunization History   Administered Date(s) Administered    COVID-19 PFIZER VACCINE 0.3 ML IM 01/15/2021, 2021    INFLUENZA 2020    Tdap 2019     Objective   /70   Pulse 90   Wt 121 kg (267 lb 12.8 oz)   SpO2 96%   BMI 32.60 kg/m²     Physical Exam  Vitals and nursing note reviewed.   Constitutional:       General: He is not in acute distress.     Appearance: Normal appearance. He is not ill-appearing, toxic-appearing or diaphoretic.     Eyes:      Conjunctiva/sclera: Conjunctivae normal.       Cardiovascular:      Rate and Rhythm: Normal rate and regular rhythm.      Heart sounds: No  murmur heard.  Pulmonary:      Effort: Pulmonary effort is normal.      Breath sounds: Normal breath sounds.   Abdominal:      General: There is no distension.      Palpations: Abdomen is soft. There is no mass.      Tenderness: There is no abdominal tenderness.     Musculoskeletal:      Cervical back: Normal range of motion and neck supple.      Right lower leg: No edema.      Left lower leg: No edema.   Lymphadenopathy:      Cervical: No cervical adenopathy.     Skin:     Findings: No rash.     Neurological:      General: No focal deficit present.      Mental Status: He is alert and oriented to person, place, and time.     Psychiatric:         Mood and Affect: Mood normal.                [1]   Past Medical History:  Diagnosis Date    Anxiety     Arthritis     KESHAWN (obstructive sleep apnea)    [2]   Past Surgical History:  Procedure Laterality Date    ANTERIOR CRUCIATE LIGAMENT REPAIR      FOOT SURGERY      KNEE SURGERY Bilateral     bilateral knee replacements    WA DEBRIDEMENT BONE 1ST 20 SQ CM/< Left 04/16/2019    Procedure: DEBRIDEMENT HAND Skin, subcutaneous tissue, fascia, and muscle wound with measuring 8 mm, 8 mm in length, depth includes 2 cm.;  Surgeon: Ty Iglesias MD;  Location: [facility];  Service: Orthopedics    SHOULDER SURGERY Bilateral     2 on right 1 on left   [3]   Family History  Problem Relation Name Age of Onset    No Known Problems Mother      Dementia Father     [4]   Social History  Tobacco Use    Smoking status: Some Days     Current packs/day: 0.10     Average packs/day: 1.5 packs/day for 39.2 years (58.5 ttl pk-yrs)     Types: Cigarettes     Start date: 1986     Last attempt to quit: 1/1/2025    Smokeless tobacco: Never   Vaping Use    Vaping status: Never Used   Substance and Sexual Activity    Alcohol use: Yes     Alcohol/week: 15.0 standard drinks of alcohol     Types: 15 Shots of liquor per week     Comment: mostly on the weekends- captain and coke; 6-12 drinks over course of  weekend    Drug use: Never   [5]   Current Outpatient Medications on File Prior to Visit   Medication Sig    Zepbound 2.5 MG/0.5ML auto-injector INJECT 1/2 (ONE-HALF) ML  ONCE A WEEK FOR 28 DAYS    [DISCONTINUED] Clotrimazole 1 % LOTN Apply 1 Application topically in the morning and 1 Application in the evening. duration: 7 day(s). (Patient not taking: Reported on 7/17/2025)

## 2025-07-17 ENCOUNTER — OFFICE VISIT (OUTPATIENT)
Dept: FAMILY MEDICINE CLINIC | Facility: CLINIC | Age: 60
End: 2025-07-17
Payer: COMMERCIAL

## 2025-07-17 VITALS
HEART RATE: 90 BPM | WEIGHT: 267.8 LBS | DIASTOLIC BLOOD PRESSURE: 70 MMHG | BODY MASS INDEX: 32.6 KG/M2 | OXYGEN SATURATION: 96 % | SYSTOLIC BLOOD PRESSURE: 138 MMHG

## 2025-07-17 DIAGNOSIS — E66.811 CLASS 1 OBESITY WITH SERIOUS COMORBIDITY AND BODY MASS INDEX (BMI) OF 30.0 TO 30.9 IN ADULT, UNSPECIFIED OBESITY TYPE: ICD-10-CM

## 2025-07-17 DIAGNOSIS — Z79.899 MEDICATION MANAGEMENT: Primary | ICD-10-CM

## 2025-07-17 PROBLEM — E66.9 OBESITY: Status: RESOLVED | Noted: 2025-06-18 | Resolved: 2025-07-17

## 2025-07-17 PROCEDURE — 99213 OFFICE O/P EST LOW 20 MIN: CPT | Performed by: FAMILY MEDICINE

## 2025-07-17 RX ORDER — TIRZEPATIDE 5 MG/.5ML
5 INJECTION, SOLUTION SUBCUTANEOUS WEEKLY
Qty: 2 ML | Refills: 0 | Status: SHIPPED | OUTPATIENT
Start: 2025-07-17

## 2025-07-17 NOTE — ASSESSMENT & PLAN NOTE
{If prescribing weight loss medication, click here to fill out prior auth smartform and then hit F2 with this smartlist to insert prior auth documentation (Optional):[id number]}    Orders:    tirzepatide (Zepbound) 5 mg/0.5 mL auto-injector; Inject 0.5 mL (5 mg total) under the skin once a week

## 2025-07-21 ENCOUNTER — TELEPHONE (OUTPATIENT)
Dept: FAMILY MEDICINE CLINIC | Facility: CLINIC | Age: 60
End: 2025-07-21

## 2025-07-21 NOTE — TELEPHONE ENCOUNTER
Please initiate prior auth for patient's Zepbound rx.    [facility]: [id number]    Notify patient and pharmacy once determination is received.    Thank you!!

## 2025-07-21 NOTE — TELEPHONE ENCOUNTER
[place] Zepbound 5 MG/0.5ML CANCELLED due to     [x]Approval on file-dates approved 6/20/25 - 1/20/26  []Medication already on Formulary  []Brand Name Preferred  []Patient no longer covered by insurance  []Therapy Changed/Medication Discontinued    Message sent to office clinical pool Yes    Scanned into Media

## 2025-07-22 NOTE — TELEPHONE ENCOUNTER
Patient called requesting refill for zepbound 5 mg. Patient made aware medication was refilled on 7/17/25 for 2 ml with 0 refills to [facility] pharmacy. Patient instructed to contact the pharmacy and speak with someone directly to obtain refills of medication. Patient advised to call back for refill if their pharmacy is unable to assist them. Patient verbalized understanding.

## 2025-07-23 NOTE — TELEPHONE ENCOUNTER
Patient went to  his order for Zepbound 5mg. He was told by the pharmacist at [facility] that they are awaiting doctor approval. Patient is concerned because he is due to start the 5mg dosage today, 07/23/25.    Please contact [facility] to clarify why they are unabe to fill the prescription.    Patient is requesting a follow up phone call at #[phone]

## 2025-08-04 ENCOUNTER — OFFICE VISIT (OUTPATIENT)
Age: 60
End: 2025-08-04
Payer: COMMERCIAL

## 2025-08-04 VITALS — BODY MASS INDEX: 32.63 KG/M2 | WEIGHT: 268 LBS | HEIGHT: 76 IN

## 2025-08-04 DIAGNOSIS — Z98.890 S/P LEFT ROTATOR CUFF REPAIR: Primary | ICD-10-CM

## 2025-08-04 PROCEDURE — 99213 OFFICE O/P EST LOW 20 MIN: CPT | Performed by: PHYSICIAN ASSISTANT

## 2025-08-05 RX ORDER — MELOXICAM 15 MG/1
15 TABLET ORAL DAILY
Qty: 14 TABLET | Refills: 0 | Status: SHIPPED | OUTPATIENT
Start: 2025-08-05

## 2025-08-06 PROBLEM — R42 VERTIGO: Status: ACTIVE | Noted: 2025-08-06

## 2025-08-06 PROBLEM — E78.2 MIXED HYPERLIPIDEMIA: Status: ACTIVE | Noted: 2025-08-06

## 2025-08-06 PROBLEM — N52.9 ED (ERECTILE DYSFUNCTION): Status: ACTIVE | Noted: 2025-08-06

## 2025-08-06 PROBLEM — G62.9 POLYNEUROPATHY: Status: ACTIVE | Noted: 2025-08-06

## 2025-08-18 ENCOUNTER — TELEPHONE (OUTPATIENT)
Dept: FAMILY MEDICINE CLINIC | Facility: CLINIC | Age: 60
End: 2025-08-18

## 2025-08-18 DIAGNOSIS — Z79.899 MEDICATION MANAGEMENT: ICD-10-CM

## 2025-08-18 DIAGNOSIS — E66.811 CLASS 1 OBESITY WITH SERIOUS COMORBIDITY AND BODY MASS INDEX (BMI) OF 30.0 TO 30.9 IN ADULT, UNSPECIFIED OBESITY TYPE: ICD-10-CM

## 2025-08-18 RX ORDER — TIRZEPATIDE 5 MG/.5ML
5 INJECTION, SOLUTION SUBCUTANEOUS WEEKLY
Qty: 2 ML | Refills: 0 | Status: SHIPPED | OUTPATIENT
Start: 2025-08-18 | End: 2025-08-21

## 2025-08-20 PROBLEM — Z79.899 MEDICATION MANAGEMENT: Status: ACTIVE | Noted: 2025-08-20

## 2025-08-21 ENCOUNTER — OFFICE VISIT (OUTPATIENT)
Dept: FAMILY MEDICINE CLINIC | Facility: CLINIC | Age: 60
End: 2025-08-21
Payer: COMMERCIAL

## 2025-08-21 VITALS
OXYGEN SATURATION: 97 % | BODY MASS INDEX: 32.26 KG/M2 | HEART RATE: 80 BPM | SYSTOLIC BLOOD PRESSURE: 139 MMHG | DIASTOLIC BLOOD PRESSURE: 73 MMHG | WEIGHT: 265 LBS

## 2025-08-21 DIAGNOSIS — Z13.1 SCREENING FOR DIABETES MELLITUS: ICD-10-CM

## 2025-08-21 DIAGNOSIS — I25.10 CORONARY ARTERY DISEASE INVOLVING NATIVE HEART WITHOUT ANGINA PECTORIS, UNSPECIFIED VESSEL OR LESION TYPE: ICD-10-CM

## 2025-08-21 DIAGNOSIS — Z13.0 SCREENING FOR DEFICIENCY ANEMIA: ICD-10-CM

## 2025-08-21 DIAGNOSIS — E78.2 MIXED HYPERLIPIDEMIA: ICD-10-CM

## 2025-08-21 DIAGNOSIS — Z12.83 SKIN CANCER SCREENING: ICD-10-CM

## 2025-08-21 DIAGNOSIS — Z12.5 SCREENING FOR PROSTATE CANCER: ICD-10-CM

## 2025-08-21 DIAGNOSIS — R03.0 ELEVATED BLOOD PRESSURE READING WITHOUT DIAGNOSIS OF HYPERTENSION: ICD-10-CM

## 2025-08-21 DIAGNOSIS — Z79.899 MEDICATION MANAGEMENT: ICD-10-CM

## 2025-08-21 DIAGNOSIS — Z23 ENCOUNTER FOR IMMUNIZATION: ICD-10-CM

## 2025-08-21 DIAGNOSIS — Z72.0 TOBACCO USER: ICD-10-CM

## 2025-08-21 DIAGNOSIS — Z00.00 ANNUAL PHYSICAL EXAM: Primary | ICD-10-CM

## 2025-08-21 DIAGNOSIS — G47.33 OSA ON CPAP: ICD-10-CM

## 2025-08-21 DIAGNOSIS — Z13.29 SCREENING FOR THYROID DISORDER: ICD-10-CM

## 2025-08-21 PROCEDURE — 99214 OFFICE O/P EST MOD 30 MIN: CPT | Performed by: FAMILY MEDICINE

## 2025-08-21 PROCEDURE — 90471 IMMUNIZATION ADMIN: CPT

## 2025-08-21 PROCEDURE — 90677 PCV20 VACCINE IM: CPT

## 2025-08-21 PROCEDURE — 90472 IMMUNIZATION ADMIN EACH ADD: CPT

## 2025-08-21 PROCEDURE — 90750 HZV VACC RECOMBINANT IM: CPT

## 2025-08-21 PROCEDURE — 99396 PREV VISIT EST AGE 40-64: CPT | Performed by: FAMILY MEDICINE

## 2025-08-21 RX ORDER — TIRZEPATIDE 7.5 MG/.5ML
7.5 INJECTION, SOLUTION SUBCUTANEOUS WEEKLY
Qty: 2 ML | Refills: 0 | Status: SHIPPED | OUTPATIENT
Start: 2025-08-21

## (undated) DEVICE — INTENDED FOR TISSUE SEPARATION, AND OTHER PROCEDURES THAT REQUIRE A SHARP SURGICAL BLADE TO PUNCTURE OR CUT.: Brand: BARD-PARKER SAFETY BLADES SIZE 15, STERILE

## (undated) DEVICE — SPLINT 4 X 15 IN FAST SET PLASTER

## (undated) DEVICE — SPONGE PVP SCRUB WING STERILE

## (undated) DEVICE — DISPOSABLE EQUIPMENT COVER: Brand: SMALL TOWEL DRAPE

## (undated) DEVICE — GLOVE SRG BIOGEL 7.5

## (undated) DEVICE — CUFF TOURNIQUET 18 X 4 IN QUICK CONNECT DISP 1 BLADDER

## (undated) DEVICE — GLOVE INDICATOR PI UNDERGLOVE SZ 8 BLUE

## (undated) DEVICE — IODOFORM PACKING STRIP: Brand: CURITY

## (undated) DEVICE — CHLORAPREP HI-LITE 26ML ORANGE

## (undated) DEVICE — STERILE BETHLEHEM PLASTIC HAND: Brand: CARDINAL HEALTH

## (undated) DEVICE — ACE WRAP 4 IN UNSTERILE

## (undated) DEVICE — PAD CAST 3 IN COTTON NON STRL

## (undated) DEVICE — SUT PROLENE 4-0 PS-2 18 IN 8682G

## (undated) DEVICE — GAUZE SPONGES,16 PLY: Brand: CURITY

## (undated) DEVICE — NEEDLE 25G X 1 1/2